# Patient Record
Sex: FEMALE | Race: WHITE | NOT HISPANIC OR LATINO | Employment: OTHER | URBAN - METROPOLITAN AREA
[De-identification: names, ages, dates, MRNs, and addresses within clinical notes are randomized per-mention and may not be internally consistent; named-entity substitution may affect disease eponyms.]

---

## 2023-02-05 PROBLEM — L65.9 HAIR LOSS: Status: ACTIVE | Noted: 2023-02-05

## 2023-02-05 PROBLEM — R10.9 ABDOMINAL CRAMPING: Status: ACTIVE | Noted: 2023-02-05

## 2023-02-05 PROBLEM — L65.9 ALOPECIA: Status: ACTIVE | Noted: 2023-02-05

## 2023-02-05 PROBLEM — M54.50 LOW BACK PAIN: Status: ACTIVE | Noted: 2023-02-05

## 2023-02-05 PROBLEM — E78.2 MIXED HYPERLIPIDEMIA: Status: ACTIVE | Noted: 2023-02-05

## 2023-02-05 PROBLEM — R73.9 ELEVATED BLOOD SUGAR: Status: ACTIVE | Noted: 2023-02-05

## 2023-02-05 PROBLEM — E55.9 VITAMIN D DEFICIENCY: Status: ACTIVE | Noted: 2023-02-05

## 2023-02-05 PROBLEM — R13.10 DYSPHAGIA: Status: ACTIVE | Noted: 2023-02-05

## 2023-02-05 PROBLEM — S76.319A HAMSTRING STRAIN: Status: ACTIVE | Noted: 2023-02-05

## 2023-02-05 PROBLEM — M79.643 HAND PAIN: Status: ACTIVE | Noted: 2023-02-05

## 2023-02-05 PROBLEM — M25.551 HIP PAIN, RIGHT: Status: ACTIVE | Noted: 2023-02-05

## 2023-02-05 PROBLEM — J02.9 SORE THROAT: Status: ACTIVE | Noted: 2023-02-05

## 2023-02-05 PROBLEM — M62.81 WEAKNESS OF TRUNK MUSCULATURE: Status: ACTIVE | Noted: 2023-02-05

## 2023-02-05 PROBLEM — J38.3 OTHER DISEASES OF VOCAL CORDS: Status: ACTIVE | Noted: 2023-02-05

## 2023-02-05 PROBLEM — R31.9 HEMATURIA: Status: ACTIVE | Noted: 2023-02-05

## 2023-02-05 PROBLEM — R09.89 RESPIRATORY SYMPTOMS: Status: ACTIVE | Noted: 2023-02-05

## 2023-02-05 PROBLEM — R94.5 LIVER FUNCTION STUDY, ABNORMAL: Status: ACTIVE | Noted: 2023-02-05

## 2023-02-05 PROBLEM — G25.0 ESSENTIAL TREMOR: Status: ACTIVE | Noted: 2023-02-05

## 2023-02-05 PROBLEM — M85.80 OSTEOPENIA: Status: ACTIVE | Noted: 2023-02-05

## 2023-02-05 PROBLEM — M17.9 KNEE OSTEOARTHRITIS: Status: ACTIVE | Noted: 2023-02-05

## 2023-02-05 PROBLEM — U07.1 COVID-19: Status: ACTIVE | Noted: 2023-02-05

## 2023-02-05 PROBLEM — F07.81 POSTCONCUSSION SYNDROME: Status: ACTIVE | Noted: 2023-02-05

## 2023-02-05 PROBLEM — M25.519 SHOULDER PAIN: Status: ACTIVE | Noted: 2023-02-05

## 2023-02-05 PROBLEM — I10 HYPERTENSION: Status: ACTIVE | Noted: 2023-02-05

## 2023-02-05 PROBLEM — R19.5 WATERY STOOLS: Status: ACTIVE | Noted: 2023-02-05

## 2023-02-05 PROBLEM — R41.3 IMPAIRED MEMORY: Status: ACTIVE | Noted: 2023-02-05

## 2023-02-05 PROBLEM — M25.552 HIP PAIN, LEFT: Status: ACTIVE | Noted: 2023-02-05

## 2023-02-05 PROBLEM — H61.23 BILATERAL IMPACTED CERUMEN: Status: ACTIVE | Noted: 2023-02-05

## 2023-02-05 PROBLEM — M25.569 KNEE PAIN: Status: ACTIVE | Noted: 2023-02-05

## 2023-02-05 PROBLEM — H61.21 EXCESSIVE CERUMEN IN EAR CANAL, RIGHT: Status: ACTIVE | Noted: 2023-02-05

## 2023-02-05 PROBLEM — R19.8 GASTROINTESTINAL SYMPTOM: Status: ACTIVE | Noted: 2023-02-05

## 2023-02-05 PROBLEM — J32.9 SINUSITIS: Status: ACTIVE | Noted: 2023-02-05

## 2023-02-05 PROBLEM — M54.30 SCIATICA: Status: ACTIVE | Noted: 2023-02-05

## 2023-02-05 PROBLEM — R51.9 SCALP PAIN: Status: ACTIVE | Noted: 2023-02-05

## 2023-02-05 PROBLEM — K21.9 GASTROESOPHAGEAL REFLUX DISEASE: Status: ACTIVE | Noted: 2023-02-05

## 2023-02-05 RX ORDER — FLUTICASONE PROPIONATE 50 MCG
2 SPRAY, SUSPENSION (ML) NASAL DAILY PRN
COMMUNITY
Start: 2020-06-15

## 2023-02-05 RX ORDER — ATORVASTATIN CALCIUM 10 MG/1
1 TABLET, FILM COATED ORAL DAILY
COMMUNITY
Start: 2013-12-23 | End: 2023-05-31 | Stop reason: SDUPTHER

## 2023-02-05 RX ORDER — CHOLECALCIFEROL (VITAMIN D3) 25 MCG
TABLET ORAL
COMMUNITY
Start: 2021-05-03

## 2023-02-05 RX ORDER — CARVEDILOL 6.25 MG/1
1 TABLET ORAL
COMMUNITY
Start: 2018-11-13 | End: 2023-03-28 | Stop reason: SDUPTHER

## 2023-02-05 RX ORDER — MULTIVITAMIN
1 TABLET ORAL DAILY
COMMUNITY

## 2023-02-05 RX ORDER — ZOLEDRONIC ACID 5 MG/100ML
INJECTION, SOLUTION INTRAVENOUS
COMMUNITY
End: 2023-10-04 | Stop reason: SDUPTHER

## 2023-02-05 RX ORDER — ELECTROLYTES/DEXTROSE
1 SOLUTION, ORAL ORAL DAILY
COMMUNITY
Start: 2021-12-16

## 2023-03-28 ENCOUNTER — OFFICE VISIT (OUTPATIENT)
Dept: PRIMARY CARE | Facility: CLINIC | Age: 73
End: 2023-03-28
Payer: MEDICARE

## 2023-03-28 VITALS
HEART RATE: 75 BPM | SYSTOLIC BLOOD PRESSURE: 135 MMHG | HEIGHT: 65 IN | WEIGHT: 184 LBS | TEMPERATURE: 97.8 F | OXYGEN SATURATION: 96 % | DIASTOLIC BLOOD PRESSURE: 76 MMHG | BODY MASS INDEX: 30.66 KG/M2

## 2023-03-28 DIAGNOSIS — Z00.00 WELL ADULT EXAM: ICD-10-CM

## 2023-03-28 DIAGNOSIS — M85.80 OSTEOPENIA, UNSPECIFIED LOCATION: ICD-10-CM

## 2023-03-28 DIAGNOSIS — E55.9 VITAMIN D DEFICIENCY: ICD-10-CM

## 2023-03-28 DIAGNOSIS — Z78.0 ASYMPTOMATIC MENOPAUSE: ICD-10-CM

## 2023-03-28 DIAGNOSIS — K21.9 GASTROESOPHAGEAL REFLUX DISEASE WITHOUT ESOPHAGITIS: ICD-10-CM

## 2023-03-28 DIAGNOSIS — Z12.11 ENCOUNTER FOR SCREENING COLONOSCOPY: ICD-10-CM

## 2023-03-28 DIAGNOSIS — G25.0 ESSENTIAL TREMOR: ICD-10-CM

## 2023-03-28 DIAGNOSIS — Z12.31 SCREENING MAMMOGRAM, ENCOUNTER FOR: ICD-10-CM

## 2023-03-28 DIAGNOSIS — I10 HYPERTENSION, UNSPECIFIED TYPE: Primary | ICD-10-CM

## 2023-03-28 PROBLEM — R10.9 ABDOMINAL CRAMPING: Status: RESOLVED | Noted: 2023-02-05 | Resolved: 2023-03-28

## 2023-03-28 PROBLEM — R09.89 RESPIRATORY SYMPTOMS: Status: RESOLVED | Noted: 2023-02-05 | Resolved: 2023-03-28

## 2023-03-28 PROCEDURE — 1159F MED LIST DOCD IN RCRD: CPT | Performed by: INTERNAL MEDICINE

## 2023-03-28 PROCEDURE — 1160F RVW MEDS BY RX/DR IN RCRD: CPT | Performed by: INTERNAL MEDICINE

## 2023-03-28 PROCEDURE — 3078F DIAST BP <80 MM HG: CPT | Performed by: INTERNAL MEDICINE

## 2023-03-28 PROCEDURE — 3075F SYST BP GE 130 - 139MM HG: CPT | Performed by: INTERNAL MEDICINE

## 2023-03-28 PROCEDURE — 93000 ELECTROCARDIOGRAM COMPLETE: CPT | Performed by: INTERNAL MEDICINE

## 2023-03-28 PROCEDURE — G0439 PPPS, SUBSEQ VISIT: HCPCS | Performed by: INTERNAL MEDICINE

## 2023-03-28 PROCEDURE — 99214 OFFICE O/P EST MOD 30 MIN: CPT | Performed by: INTERNAL MEDICINE

## 2023-03-28 PROCEDURE — 1036F TOBACCO NON-USER: CPT | Performed by: INTERNAL MEDICINE

## 2023-03-28 RX ORDER — CARVEDILOL 6.25 MG/1
6.25 TABLET ORAL
Qty: 180 TABLET | Refills: 3 | Status: SHIPPED | OUTPATIENT
Start: 2023-03-28 | End: 2023-04-03 | Stop reason: SDUPTHER

## 2023-03-28 ASSESSMENT — PATIENT HEALTH QUESTIONNAIRE - PHQ9
1. LITTLE INTEREST OR PLEASURE IN DOING THINGS: NOT AT ALL
SUM OF ALL RESPONSES TO PHQ9 QUESTIONS 1 AND 2: 1
10. IF YOU CHECKED OFF ANY PROBLEMS, HOW DIFFICULT HAVE THESE PROBLEMS MADE IT FOR YOU TO DO YOUR WORK, TAKE CARE OF THINGS AT HOME, OR GET ALONG WITH OTHER PEOPLE: NOT DIFFICULT AT ALL
2. FEELING DOWN, DEPRESSED OR HOPELESS: SEVERAL DAYS

## 2023-03-28 NOTE — PROGRESS NOTES
Assessment and Plan:  Problem List Items Addressed This Visit          Nervous    Essential tremor    Relevant Orders    Referral to Neurology    Comprehensive Metabolic Panel    TSH with reflex to Free T4 if abnormal    Vitamin D, Total    Lipid Panel    CBC and Auto Differential       Circulatory    Hypertension - Primary    Overview     1/25/23 On coreg. To follow up in 2-4 weeks with cuff and readings.         Relevant Medications    carvedilol (Coreg) 6.25 mg tablet    Other Relevant Orders    Comprehensive Metabolic Panel    TSH with reflex to Free T4 if abnormal    Vitamin D, Total    Lipid Panel    CBC and Auto Differential       Digestive    Gastroesophageal reflux disease    Relevant Orders    Comprehensive Metabolic Panel    TSH with reflex to Free T4 if abnormal    Vitamin D, Total    Lipid Panel    CBC and Auto Differential       Musculoskeletal    Osteopenia    Overview     6/19 BMD: -1.9; 6/21 BMD -1.0 (WNL). Fosamax [id number]. 11/22 Reclast given at [facility] (formerly [facility]  10/28/22 Reclast every other year.         Relevant Orders    XR DEXA bone density       Endocrine/Metabolic    Vitamin D deficiency    Relevant Orders    Vitamin D, Total     Other Visit Diagnoses       Encounter for screening colonoscopy        Relevant Orders    Referral to Gastroenterology    Screening mammogram, encounter for        Relevant Orders    BI mammo bilateral screening tomosynthesis    Asymptomatic menopause        Relevant Orders    XR DEXA bone density    Well adult exam            Xochilt was seen today for medicare annual wellness visit subsequent.  Diagnoses and all orders for this visit:  Hypertension, unspecified type (Primary)  -     carvedilol (Coreg) 6.25 mg tablet; Take 1 tablet (6.25 mg) by mouth in the morning and 1 tablet (6.25 mg) in the evening. Take with meals.  -     Comprehensive Metabolic Panel; Future  -     TSH with reflex to Free T4 if abnormal; Future  -     Vitamin D, Total; Future  -     Lipid  Panel; Future  -     CBC and Auto Differential; Future  Gastroesophageal reflux disease without esophagitis  -     Comprehensive Metabolic Panel; Future  -     TSH with reflex to Free T4 if abnormal; Future  -     Vitamin D, Total; Future  -     Lipid Panel; Future  -     CBC and Auto Differential; Future  Osteopenia, unspecified location  Comments:  had reclast in nov 2022  Orders:  -     XR DEXA bone density; Future  Essential tremor  -     Referral to Neurology; Future  -     Comprehensive Metabolic Panel; Future  -     TSH with reflex to Free T4 if abnormal; Future  -     Vitamin D, Total; Future  -     Lipid Panel; Future  -     CBC and Auto Differential; Future  Encounter for screening colonoscopy  -     Referral to Gastroenterology; Future  Screening mammogram, encounter for  -     BI mammo bilateral screening tomosynthesis; Future  Asymptomatic menopause  -     XR DEXA bone density; Future  Vitamin D deficiency  -     Vitamin D, Total; Future  Well adult exam        Chief Complaint:   Medicare Wellness Exam/Comprehensive Problem Focused Follow Up and Physical Exam    HPI:  Bp 113/54  No cp no sob  No depression    No sleep concerns  No elimination concerns  No depression  Diet and exercise discussed  Has living will,  is poa    Patient Care Team:  Aida Santos MD as PCP - General  Aida Santos MD as PCP - [facility] Attributed Provider   Active Problem List  Patient Active Problem List   Diagnosis    Alopecia    Bilateral impacted cerumen    Dysphagia    Elevated blood sugar    Essential tremor    Excessive cerumen in ear canal, right    Gastroesophageal reflux disease    Hair loss    Hamstring strain    Hand pain    Shoulder pain    Hematuria    Hip pain, left    Hip pain, right    Mixed hyperlipidemia    Hypertension    Impaired memory    Postconcussion syndrome    Knee osteoarthritis    Knee pain    Liver function study, abnormal    Low back pain    Osteopenia    Other diseases of  vocal cords    Scalp pain    Sciatica    Gastrointestinal symptom    Sore throat    Vitamin D deficiency    Watery stools    Weakness of trunk musculature    Sinusitis    COVID-19         Comprehensive Medical/Surgical/Social/Family History  Past Medical History:   Diagnosis Date    Abscess of eyelid right eye, unspecified eyelid 06/15/2020    Cellulitis of right eyelid    Age-related osteoporosis without current pathological fracture 02/22/2021    Age related osteoporosis    Body mass index (BMI) 24.0-24.9, adult 11/18/2020    Body mass index (BMI) of 24.0 to 24.9 in adult    Chronic sinusitis, unspecified 03/02/2021    Other sinusitis    Encounter for other screening for malignant neoplasm of breast 02/22/2021    Breast screening    Encounter for screening for depression 11/18/2020    Depression screen    Encounter for screening for malignant neoplasm of cervix 11/18/2020    Cervical cancer screening    Headache, unspecified 02/23/2022    Scalp pain    Personal history of other diseases of the circulatory system 03/02/2021    History of subdural hematoma    Personal history of other diseases of the respiratory system 11/13/2018    History of allergic rhinitis    Personal history of other medical treatment     H/O bone density study     Past Surgical History:   Procedure Laterality Date    OTHER SURGICAL HISTORY  11/13/2018    Kidney surgery    OTHER SURGICAL HISTORY  11/13/2018    Rotator cuff repair    OTHER SURGICAL HISTORY  11/13/2018    Nephrectomy    OTHER SURGICAL HISTORY  2012    Colonoscopy     Social History     Social History Narrative    Not on file     Tobacco/Alcohol/Opioid use, as well as Illicit Drug Use was screened for/reviewed and documented in Social Documentation section of the chart and medication list as appropriate    Allergies and Medications  Codeine  Current Outpatient Medications   Medication Instructions    atorvastatin (Lipitor) 10 mg tablet 1 tablet, oral, Daily    biotin 5 mg capsule  "1 capsule, oral, Daily    carvedilol (COREG) 6.25 mg, oral, 2 times daily with meals    cholecalciferol (Vitamin D-3) 25 MCG (1000 UT) tablet Vitamin D3 25 MCG (1000 UT) Oral Tablet   Refills: 0        Start : 3-May-2021   Active    fluticasone (Flonase) 50 mcg/actuation nasal spray 2 sprays, Each Nostril, Daily PRN    multivitamin tablet 1 tablet, oral, Daily, (Nutritional Supplement).    zoledronic acid (Reclast) 5 mg/100 mL piggyback Infuse 5mg intravenously over 15 minutes as directed.     Medications and Supplements  prescribed by me and other practitioners or clinical pharmacist (such as prescriptions, OTC's, herbal therapies and supplements) were reviewed and documented in the medical record.      Activities of Daily Living  In your present state of health, do you have any difficulty performing the following activities?:   Preparing food and eating?: No  Bathing yourself: No  Getting dressed: No  Using the toilet:No  Moving around from place to place: No  In the past year have you fallen or had a near fall?:No  Able to manage finances independently: Yes  Able to perform grocery shopping: Yes  Able to manage medications independently: Yes  Able to do housework independently: Yes  Patient self-assessment of health status? Good    Depression Screen  (Note: if answer to either of the following is \"Yes\", then a more complete depression screening is indicated)   Q1: Over the past two weeks, have you felt down, depressed or hopeless? Yes  Q2: Over the past two weeks, have you felt little interest or pleasure in doing things? No    Current exercise habits: yes   Dietary issues discussed: Yes  Hearing difficulties: No  Safe in current home environment: Yes  Visual Acuity assessed: Yes  Cognitive Impairment No    Advance directives  Advanced Care Planning (including a Living Will, Healthcare POA, as well as specific end of life choices and/or directives), was discussed with the patient and/or surrogate, voluntarily, " and documented in the Problem List of the medical record.     Cardiac Risk Assessment  Cardiovascular risk was discussed and, if needed, lifestyle modifications recommended, including nutritional choices, exercise, and elimination of habits contributing to risk. We agreed on a plan to reduce the current cardiovascular risk based on above discussion as needed.  Aspirin use/disuse was discussed and documented in the Problem List of the medical record after reviewing the updated guidelines below:    Consider low dose Aspirin ( mg) use if the benefit for cardiovascular disease prevention outweighs risk for bleeding complications.   In general, low dose ASA should be considered:  In patients WITHOUT prior MI/stroke/PAD (primary prevention):   a. Age <60: Use if 10-year cardiovascular disease risk >20%, with discussion of risks and benefits with patient  b. Age 60-<70: Use if 10-year cardiovascular disease risk >20% and low bleeding (e.g., gastrointenstinal) risk, with discussion of risks and benefits with patient  c. Age >=70: Do not use    ROS otherwise negative aside from what was mentioned above in HPI.      Physical Exam  General Appearance:  Alert and oriented.  NAD  HEENT:  Tm's normal , throat clear, no erythema  Lungs, CTAB  Skin:  no suspicious lesions,  warm and dry  Head :  Normocephalic  Oral Cavity; Clear mucosa moist  Neck/thyroid:  neck supple, full rom, no cervical lymphadenopathy  no thyromegaly  Heart:  RRR  no murmurs  Abdomen:  Normal , bs present, soft, nontender, not distended, no masses palpated  Extremities:  No clubbing, cyanosis, or edema  Neurologic:  Nonfocal  Psych: alert, normal mood  Breasts no masses  No axillary lymphadenopathy      During the course of the visit the patient was educated and counseled about age appropriate screening and preventive services. Completed preventive screenings were documented in the chart and orders were placed for outstanding screenings/procedures as  documented in the Assessment and Plan.    Patient Instructions (the written plan) was given to the patient at check out.

## 2023-04-03 DIAGNOSIS — I10 HYPERTENSION, UNSPECIFIED TYPE: ICD-10-CM

## 2023-04-03 RX ORDER — CARVEDILOL 6.25 MG/1
6.25 TABLET ORAL
Qty: 180 TABLET | Refills: 3 | Status: SHIPPED | OUTPATIENT
Start: 2023-04-03 | End: 2023-04-14 | Stop reason: SDUPTHER

## 2023-04-14 DIAGNOSIS — I10 HYPERTENSION, UNSPECIFIED TYPE: ICD-10-CM

## 2023-04-14 RX ORDER — CARVEDILOL 6.25 MG/1
6.25 TABLET ORAL
Qty: 180 TABLET | Refills: 3 | Status: SHIPPED | OUTPATIENT
Start: 2023-04-14 | End: 2024-03-08 | Stop reason: SDUPTHER

## 2023-05-12 ENCOUNTER — LAB (OUTPATIENT)
Dept: LAB | Facility: LAB | Age: 73
End: 2023-05-12
Payer: MEDICARE

## 2023-05-12 DIAGNOSIS — I10 HYPERTENSION, UNSPECIFIED TYPE: ICD-10-CM

## 2023-05-12 DIAGNOSIS — K21.9 GASTROESOPHAGEAL REFLUX DISEASE WITHOUT ESOPHAGITIS: ICD-10-CM

## 2023-05-12 DIAGNOSIS — R94.5 LIVER FUNCTION STUDY, ABNORMAL: Primary | ICD-10-CM

## 2023-05-12 DIAGNOSIS — G25.0 ESSENTIAL TREMOR: ICD-10-CM

## 2023-05-12 DIAGNOSIS — E55.9 VITAMIN D DEFICIENCY: ICD-10-CM

## 2023-05-12 LAB
ALANINE AMINOTRANSFERASE (SGPT) (U/L) IN SER/PLAS: 22 U/L (ref 7–45)
ALBUMIN (G/DL) IN SER/PLAS: 4.1 G/DL (ref 3.4–5)
ALKALINE PHOSPHATASE (U/L) IN SER/PLAS: 45 U/L (ref 33–136)
ANION GAP IN SER/PLAS: 8 MMOL/L (ref 10–20)
ASPARTATE AMINOTRANSFERASE (SGOT) (U/L) IN SER/PLAS: 23 U/L (ref 9–39)
BASOPHILS (10*3/UL) IN BLOOD BY AUTOMATED COUNT: 0.09 X10E9/L (ref 0–0.1)
BASOPHILS/100 LEUKOCYTES IN BLOOD BY AUTOMATED COUNT: 1.6 % (ref 0–2)
BILIRUBIN TOTAL (MG/DL) IN SER/PLAS: 1.6 MG/DL (ref 0–1.2)
CALCIUM (MG/DL) IN SER/PLAS: 9.8 MG/DL (ref 8.6–10.3)
CARBON DIOXIDE, TOTAL (MMOL/L) IN SER/PLAS: 31 MMOL/L (ref 21–32)
CHLORIDE (MMOL/L) IN SER/PLAS: 102 MMOL/L (ref 98–107)
CHOLESTEROL (MG/DL) IN SER/PLAS: 155 MG/DL (ref 0–199)
CHOLESTEROL IN HDL (MG/DL) IN SER/PLAS: 51 MG/DL
CHOLESTEROL/HDL RATIO: 3
CREATININE (MG/DL) IN SER/PLAS: 0.89 MG/DL (ref 0.5–1.05)
EOSINOPHILS (10*3/UL) IN BLOOD BY AUTOMATED COUNT: 0.22 X10E9/L (ref 0–0.7)
EOSINOPHILS/100 LEUKOCYTES IN BLOOD BY AUTOMATED COUNT: 4 % (ref 0–6)
ERYTHROCYTE DISTRIBUTION WIDTH (RATIO) BY AUTOMATED COUNT: 12.5 % (ref 11.5–14.5)
ERYTHROCYTE MEAN CORPUSCULAR HEMOGLOBIN CONCENTRATION (G/DL) BY AUTOMATED: 32.5 G/DL (ref 32–36)
ERYTHROCYTE MEAN CORPUSCULAR VOLUME (FL) BY AUTOMATED COUNT: 95 FL (ref 80–100)
ERYTHROCYTES (10*6/UL) IN BLOOD BY AUTOMATED COUNT: 4.35 X10E12/L (ref 4–5.2)
GFR FEMALE: 69 ML/MIN/1.73M2
GLUCOSE (MG/DL) IN SER/PLAS: 94 MG/DL (ref 74–99)
HEMATOCRIT (%) IN BLOOD BY AUTOMATED COUNT: 41.2 % (ref 36–46)
HEMOGLOBIN (G/DL) IN BLOOD: 13.4 G/DL (ref 12–16)
IMMATURE GRANULOCYTES/100 LEUKOCYTES IN BLOOD BY AUTOMATED COUNT: 0.5 % (ref 0–0.9)
LDL: 82 MG/DL (ref 0–99)
LEUKOCYTES (10*3/UL) IN BLOOD BY AUTOMATED COUNT: 5.6 X10E9/L (ref 4.4–11.3)
LYMPHOCYTES (10*3/UL) IN BLOOD BY AUTOMATED COUNT: 1.89 X10E9/L (ref 1.2–4.8)
LYMPHOCYTES/100 LEUKOCYTES IN BLOOD BY AUTOMATED COUNT: 34.1 % (ref 13–44)
MONOCYTES (10*3/UL) IN BLOOD BY AUTOMATED COUNT: 0.62 X10E9/L (ref 0.1–1)
MONOCYTES/100 LEUKOCYTES IN BLOOD BY AUTOMATED COUNT: 11.2 % (ref 2–10)
NEUTROPHILS (10*3/UL) IN BLOOD BY AUTOMATED COUNT: 2.7 X10E9/L (ref 1.2–7.7)
NEUTROPHILS/100 LEUKOCYTES IN BLOOD BY AUTOMATED COUNT: 48.6 % (ref 40–80)
NRBC (PER 100 WBCS) BY AUTOMATED COUNT: 0 /100 WBC (ref 0–0)
PLATELETS (10*3/UL) IN BLOOD AUTOMATED COUNT: 202 X10E9/L (ref 150–450)
POTASSIUM (MMOL/L) IN SER/PLAS: 4.1 MMOL/L (ref 3.5–5.3)
PROTEIN TOTAL: 6.8 G/DL (ref 6.4–8.2)
SODIUM (MMOL/L) IN SER/PLAS: 137 MMOL/L (ref 136–145)
THYROTROPIN (MIU/L) IN SER/PLAS BY DETECTION LIMIT <= 0.05 MIU/L: 0.65 MIU/L (ref 0.44–3.98)
TRIGLYCERIDE (MG/DL) IN SER/PLAS: 111 MG/DL (ref 0–149)
UREA NITROGEN (MG/DL) IN SER/PLAS: 20 MG/DL (ref 6–23)
VLDL: 22 MG/DL (ref 0–40)

## 2023-05-12 PROCEDURE — 85025 COMPLETE CBC W/AUTO DIFF WBC: CPT

## 2023-05-12 PROCEDURE — 80061 LIPID PANEL: CPT

## 2023-05-12 PROCEDURE — 36415 COLL VENOUS BLD VENIPUNCTURE: CPT

## 2023-05-12 PROCEDURE — 82306 VITAMIN D 25 HYDROXY: CPT

## 2023-05-12 PROCEDURE — 84443 ASSAY THYROID STIM HORMONE: CPT

## 2023-05-12 PROCEDURE — 80053 COMPREHEN METABOLIC PANEL: CPT

## 2023-05-14 LAB — CALCIDIOL (25 OH VITAMIN D3) (NG/ML) IN SER/PLAS: 59 NG/ML

## 2023-05-16 ENCOUNTER — TELEPHONE (OUTPATIENT)
Dept: PRIMARY CARE | Facility: CLINIC | Age: 73
End: 2023-05-16
Payer: MEDICARE

## 2023-05-16 NOTE — TELEPHONE ENCOUNTER
----- Message from Aida Santos MD sent at 5/15/2023 10:55 AM EDT -----  Labs ok, except mild inc in lft  Do liver us  Pls order and let her know

## 2023-05-31 DIAGNOSIS — E78.2 MIXED HYPERLIPIDEMIA: ICD-10-CM

## 2023-05-31 RX ORDER — ATORVASTATIN CALCIUM 10 MG/1
10 TABLET, FILM COATED ORAL DAILY
Qty: 90 TABLET | Refills: 3 | Status: SHIPPED | OUTPATIENT
Start: 2023-05-31 | End: 2024-01-31 | Stop reason: SDUPTHER

## 2023-09-25 ENCOUNTER — LAB (OUTPATIENT)
Dept: LAB | Facility: LAB | Age: 73
End: 2023-09-25
Payer: MEDICARE

## 2023-09-25 ENCOUNTER — OFFICE VISIT (OUTPATIENT)
Dept: PRIMARY CARE | Facility: CLINIC | Age: 73
End: 2023-09-25
Payer: MEDICARE

## 2023-09-25 VITALS
WEIGHT: 173 LBS | TEMPERATURE: 97.9 F | SYSTOLIC BLOOD PRESSURE: 114 MMHG | BODY MASS INDEX: 28.82 KG/M2 | OXYGEN SATURATION: 98 % | HEART RATE: 70 BPM | HEIGHT: 65 IN | DIASTOLIC BLOOD PRESSURE: 64 MMHG

## 2023-09-25 DIAGNOSIS — R73.9 ELEVATED BLOOD SUGAR: ICD-10-CM

## 2023-09-25 DIAGNOSIS — R31.9 HEMATURIA, UNSPECIFIED TYPE: ICD-10-CM

## 2023-09-25 DIAGNOSIS — E04.9 GOITER: ICD-10-CM

## 2023-09-25 DIAGNOSIS — E78.2 MIXED HYPERLIPIDEMIA: ICD-10-CM

## 2023-09-25 DIAGNOSIS — I72.8 SPLENIC ARTERY ANEURYSM (CMS-HCC): ICD-10-CM

## 2023-09-25 DIAGNOSIS — M85.80 OSTEOPENIA, UNSPECIFIED LOCATION: ICD-10-CM

## 2023-09-25 DIAGNOSIS — M85.80 OSTEOPENIA, UNSPECIFIED LOCATION: Primary | ICD-10-CM

## 2023-09-25 PROBLEM — M53.3 SACROILIAC JOINT DYSFUNCTION OF RIGHT SIDE: Status: ACTIVE | Noted: 2018-04-09

## 2023-09-25 PROBLEM — N13.5 UPJ (URETEROPELVIC JUNCTION) OBSTRUCTION: Status: ACTIVE | Noted: 2017-01-13

## 2023-09-25 PROBLEM — N18.30 CKD (CHRONIC KIDNEY DISEASE) STAGE 3, GFR 30-59 ML/MIN (MULTI): Status: ACTIVE | Noted: 2018-03-27

## 2023-09-25 LAB
ALANINE AMINOTRANSFERASE (SGPT) (U/L) IN SER/PLAS: 18 U/L (ref 7–45)
ALBUMIN (G/DL) IN SER/PLAS: 4.4 G/DL (ref 3.4–5)
ALKALINE PHOSPHATASE (U/L) IN SER/PLAS: 55 U/L (ref 33–136)
ANION GAP IN SER/PLAS: 10 MMOL/L (ref 10–20)
APPEARANCE, URINE: CLEAR
ASPARTATE AMINOTRANSFERASE (SGOT) (U/L) IN SER/PLAS: 19 U/L (ref 9–39)
BASOPHILS (10*3/UL) IN BLOOD BY AUTOMATED COUNT: 0.1 X10E9/L (ref 0–0.1)
BASOPHILS/100 LEUKOCYTES IN BLOOD BY AUTOMATED COUNT: 1.6 % (ref 0–2)
BILIRUBIN TOTAL (MG/DL) IN SER/PLAS: 1.2 MG/DL (ref 0–1.2)
BILIRUBIN, URINE: NEGATIVE
BLOOD, URINE: NEGATIVE
CALCIUM (MG/DL) IN SER/PLAS: 10.2 MG/DL (ref 8.6–10.3)
CARBON DIOXIDE, TOTAL (MMOL/L) IN SER/PLAS: 31 MMOL/L (ref 21–32)
CHLORIDE (MMOL/L) IN SER/PLAS: 104 MMOL/L (ref 98–107)
COLOR, URINE: NORMAL
CREATININE (MG/DL) IN SER/PLAS: 0.97 MG/DL (ref 0.5–1.05)
EOSINOPHILS (10*3/UL) IN BLOOD BY AUTOMATED COUNT: 0.17 X10E9/L (ref 0–0.7)
EOSINOPHILS/100 LEUKOCYTES IN BLOOD BY AUTOMATED COUNT: 2.8 % (ref 0–6)
ERYTHROCYTE DISTRIBUTION WIDTH (RATIO) BY AUTOMATED COUNT: 12.7 % (ref 11.5–14.5)
ERYTHROCYTE MEAN CORPUSCULAR HEMOGLOBIN CONCENTRATION (G/DL) BY AUTOMATED: 33.3 G/DL (ref 32–36)
ERYTHROCYTE MEAN CORPUSCULAR VOLUME (FL) BY AUTOMATED COUNT: 95 FL (ref 80–100)
ERYTHROCYTES (10*6/UL) IN BLOOD BY AUTOMATED COUNT: 4.46 X10E12/L (ref 4–5.2)
GFR FEMALE: 62 ML/MIN/1.73M2
GLUCOSE (MG/DL) IN SER/PLAS: 88 MG/DL (ref 74–99)
GLUCOSE, URINE: NEGATIVE MG/DL
HEMATOCRIT (%) IN BLOOD BY AUTOMATED COUNT: 42.4 % (ref 36–46)
HEMOGLOBIN (G/DL) IN BLOOD: 14.1 G/DL (ref 12–16)
IMMATURE GRANULOCYTES/100 LEUKOCYTES IN BLOOD BY AUTOMATED COUNT: 0.3 % (ref 0–0.9)
KETONES, URINE: NEGATIVE MG/DL
LEUKOCYTE ESTERASE, URINE: NEGATIVE
LEUKOCYTES (10*3/UL) IN BLOOD BY AUTOMATED COUNT: 6.2 X10E9/L (ref 4.4–11.3)
LYMPHOCYTES (10*3/UL) IN BLOOD BY AUTOMATED COUNT: 2.13 X10E9/L (ref 1.2–4.8)
LYMPHOCYTES/100 LEUKOCYTES IN BLOOD BY AUTOMATED COUNT: 34.6 % (ref 13–44)
MONOCYTES (10*3/UL) IN BLOOD BY AUTOMATED COUNT: 0.63 X10E9/L (ref 0.1–1)
MONOCYTES/100 LEUKOCYTES IN BLOOD BY AUTOMATED COUNT: 10.2 % (ref 2–10)
NEUTROPHILS (10*3/UL) IN BLOOD BY AUTOMATED COUNT: 3.1 X10E9/L (ref 1.2–7.7)
NEUTROPHILS/100 LEUKOCYTES IN BLOOD BY AUTOMATED COUNT: 50.5 % (ref 40–80)
NITRITE, URINE: NEGATIVE
PH, URINE: 6 (ref 5–8)
PLATELETS (10*3/UL) IN BLOOD AUTOMATED COUNT: 272 X10E9/L (ref 150–450)
POTASSIUM (MMOL/L) IN SER/PLAS: 4.6 MMOL/L (ref 3.5–5.3)
PROTEIN TOTAL: 7 G/DL (ref 6.4–8.2)
PROTEIN, URINE: NEGATIVE MG/DL
SODIUM (MMOL/L) IN SER/PLAS: 140 MMOL/L (ref 136–145)
SPECIFIC GRAVITY, URINE: 1 (ref 1–1.03)
UREA NITROGEN (MG/DL) IN SER/PLAS: 20 MG/DL (ref 6–23)
UROBILINOGEN, URINE: <2 MG/DL (ref 0–1.9)

## 2023-09-25 PROCEDURE — 80053 COMPREHEN METABOLIC PANEL: CPT

## 2023-09-25 PROCEDURE — 36415 COLL VENOUS BLD VENIPUNCTURE: CPT

## 2023-09-25 PROCEDURE — 85025 COMPLETE CBC W/AUTO DIFF WBC: CPT

## 2023-09-25 PROCEDURE — 99214 OFFICE O/P EST MOD 30 MIN: CPT | Performed by: INTERNAL MEDICINE

## 2023-09-25 PROCEDURE — 83036 HEMOGLOBIN GLYCOSYLATED A1C: CPT

## 2023-09-25 PROCEDURE — 81003 URINALYSIS AUTO W/O SCOPE: CPT

## 2023-09-25 PROCEDURE — 1159F MED LIST DOCD IN RCRD: CPT | Performed by: INTERNAL MEDICINE

## 2023-09-25 PROCEDURE — 1036F TOBACCO NON-USER: CPT | Performed by: INTERNAL MEDICINE

## 2023-09-25 PROCEDURE — 3074F SYST BP LT 130 MM HG: CPT | Performed by: INTERNAL MEDICINE

## 2023-09-25 PROCEDURE — 3078F DIAST BP <80 MM HG: CPT | Performed by: INTERNAL MEDICINE

## 2023-09-25 PROCEDURE — 88112 CYTOPATH CELL ENHANCE TECH: CPT | Performed by: STUDENT IN AN ORGANIZED HEALTH CARE EDUCATION/TRAINING PROGRAM

## 2023-09-25 PROCEDURE — 1160F RVW MEDS BY RX/DR IN RCRD: CPT | Performed by: INTERNAL MEDICINE

## 2023-09-25 PROCEDURE — 88112 CYTOPATH CELL ENHANCE TECH: CPT

## 2023-09-25 RX ORDER — ZOLEDRONIC ACID 5 MG/100ML
5 INJECTION, SOLUTION INTRAVENOUS ONCE
Status: SHIPPED | OUTPATIENT
Start: 2023-11-27

## 2023-09-25 ASSESSMENT — PATIENT HEALTH QUESTIONNAIRE - PHQ9
SUM OF ALL RESPONSES TO PHQ9 QUESTIONS 1 AND 2: 0
2. FEELING DOWN, DEPRESSED OR HOPELESS: NOT AT ALL
1. LITTLE INTEREST OR PLEASURE IN DOING THINGS: NOT AT ALL

## 2023-09-25 NOTE — PROGRESS NOTES
"Subjective   Patient ID: Xochilt Brock is a 72 y.o. female who presents for Follow-up.  HPI  Saw nephrology -told ok to fu w me  But see urology due to inc rbc this time  Had avendano in the past  W urology including cystoscopy    No cp no sob  Spl art aneu 2017 needs fu   Not depressed  No gross hematuria  No abd pain  Also ho ureter stone   Review of Systems  Gen:  no fever  HEENT:  no trouble swallowing  CV:  no dyspnea, cyanosis  Lungs:  no shortness of breath  GI:  no constipation, no blood in stool  Vascular:  no edema  Neuro:   no weakness  Skin:  no rash  MS:no joint swelling  Gu:  no urinary complaints  All other systems have been reviewed and are negative for complaint    /64   Pulse 70   Temp 36.6 °C (97.9 °F) (Temporal)   Ht 1.638 m (5' 4.5\")   Wt 78.5 kg (173 lb)   SpO2 98%   BMI 29.24 kg/m²   Objective   Physical Exam  Lab Results   Component Value Date    WBC 5.6 05/12/2023    HGB 13.4 05/12/2023    HCT 41.2 05/12/2023    MCV 95 05/12/2023     05/12/2023     Lab Results   Component Value Date    GLUCOSE 94 05/12/2023    CALCIUM 9.8 05/12/2023     05/12/2023    K 4.1 05/12/2023    CO2 31 05/12/2023     05/12/2023    BUN 20 05/12/2023    CREATININE 0.89 05/12/2023     Social History     Socioeconomic History    Marital status:      Spouse name: None    Number of children: None    Years of education: None    Highest education level: None   Occupational History    None   Tobacco Use    Smoking status: Never    Smokeless tobacco: Never   Substance and Sexual Activity    Alcohol use: Yes    Drug use: None    Sexual activity: None   Other Topics Concern    None   Social History Narrative    None     Social Determinants of Health     Financial Resource Strain: Not on file   Food Insecurity: Not on file   Transportation Needs: Not on file   Physical Activity: Not on file   Stress: Not on file   Social Connections: Not on file   Intimate Partner Violence: Not on file   Housing " Stability: Not on file     Family History   Problem Relation Name Age of Onset    No Known Problems Mother         General:  Alert and in  NAD  Lungs, CTAB  Skin:  no suspicious lesions,  warm and dry  Head :  Normocephalic  Neck/thyroid:  neck supple, full rom, no cervical lymphadenopathy  Pos  thyromegaly  Heart:  RRR  no murmurs  Abdomen:  Normal , bs present, soft, nontender, not distended, no masses palpated  Extremities:  No clubbing, cyanosis, or edema  Neurologic:  Nonfocal  Psych: alert, normal mood    Problem List Items Addressed This Visit       Elevated blood sugar    Relevant Orders    Hemoglobin A1C    Hematuria    Relevant Orders    Urinalysis with Reflex Microscopic    cytology; Other-indicate in comment ( lab); hematuria - Miscellaneous Test    US renal complete    Mixed hyperlipidemia    Relevant Orders    Comprehensive Metabolic Panel    CBC and Auto Differential    Osteopenia - Primary    Relevant Medications    zoledronic acid (Reclast) IVPB 5 mg (Start on 11/27/2023 12:00 AM)    Other Relevant Orders    Comprehensive Metabolic Panel    CBC and Auto Differential     Other Visit Diagnoses       Splenic artery aneurysm (CMS/HCC)        Relevant Orders    CT angio abdomen w and or wo IV IV contrast            Xochilt was seen today for follow-up.  Diagnoses and all orders for this visit:  Osteopenia, unspecified location (Primary)  -     Comprehensive Metabolic Panel; Future  -     CBC and Auto Differential; Future  -     Cancel: Vitamin D 25-Hydroxy,Total (for eval of Vitamin D levels); Future  -     zoledronic acid (Reclast) IVPB 5 mg  Mixed hyperlipidemia  -     Comprehensive Metabolic Panel; Future  -     CBC and Auto Differential; Future  -     Cancel: Vitamin D 25-Hydroxy,Total (for eval of Vitamin D levels); Future  Hematuria, unspecified type  -     Urinalysis with Reflex Microscopic; Future  -     cytology; Other-indicate in comment ( lab); hematuria - Miscellaneous Test; Future  -     US  renal complete; Future  Elevated blood sugar  -     Hemoglobin A1C; Future  Splenic artery aneurysm (CMS/HCC)  -     CT angio abdomen w and or wo IV IV contrast; Future    Chronic conditions reviewed in the assessment and plan.    Continue medications unless specified otherwise.  Previous labs reviewed.    Gets reclast in nov  Follow up in 6 months.   [facility] labs reviewed

## 2023-09-26 DIAGNOSIS — I72.8 SPLENIC ARTERY ANEURYSM (CMS-HCC): Primary | ICD-10-CM

## 2023-09-26 LAB
ESTIMATED AVERAGE GLUCOSE FOR HBA1C: 105 MG/DL
HEMOGLOBIN A1C/HEMOGLOBIN TOTAL IN BLOOD: 5.3 %

## 2023-09-27 LAB
COMPLETE PATHOLOGY REPORT: NORMAL
CONVERTED CLINICAL DIAGNOSIS-HISTORY: NORMAL
CONVERTED DIAGNOSIS COMMENT: NORMAL
CONVERTED FINAL DIAGNOSIS: NORMAL
CONVERTED FINAL REPORT PDF LINK TO COPY AND PASTE: NORMAL
CONVERTED SPECIMEN DESCRIPTION: NORMAL

## 2023-10-03 ENCOUNTER — TELEPHONE (OUTPATIENT)
Dept: PRIMARY CARE | Facility: CLINIC | Age: 73
End: 2023-10-03
Payer: MEDICARE

## 2023-10-03 NOTE — TELEPHONE ENCOUNTER
Patient left message due for Reclast please format to have done at [facility], patient left message asking to be scheduled, I see formatted but not sent anywhere please send to [facility]

## 2023-10-04 DIAGNOSIS — M81.0 OSTEOPOROSIS, UNSPECIFIED OSTEOPOROSIS TYPE, UNSPECIFIED PATHOLOGICAL FRACTURE PRESENCE: Primary | ICD-10-CM

## 2023-10-04 RX ORDER — ZOLEDRONIC ACID 5 MG/100ML
5 INJECTION, SOLUTION INTRAVENOUS ONCE
Qty: 100 ML | Refills: 0 | Status: SHIPPED | OUTPATIENT
Start: 2023-10-04 | End: 2024-03-25

## 2023-10-06 ENCOUNTER — SPECIALTY PHARMACY (OUTPATIENT)
Dept: PHARMACY | Facility: CLINIC | Age: 73
End: 2023-10-06

## 2023-10-10 ENCOUNTER — TELEPHONE (OUTPATIENT)
Dept: PRIMARY CARE | Facility: CLINIC | Age: 73
End: 2023-10-10
Payer: MEDICARE

## 2023-10-10 NOTE — TELEPHONE ENCOUNTER
Received message from  Special[facility]  Zoledronic Acid Infusion will need a therapy plan  Send to DO [id number]

## 2023-10-11 ENCOUNTER — ANCILLARY PROCEDURE (OUTPATIENT)
Dept: RADIOLOGY | Facility: CLINIC | Age: 73
End: 2023-10-11
Payer: MEDICARE

## 2023-10-11 DIAGNOSIS — I72.8 SPLENIC ARTERY ANEURYSM (CMS-HCC): ICD-10-CM

## 2023-10-11 DIAGNOSIS — R31.9 HEMATURIA, UNSPECIFIED TYPE: ICD-10-CM

## 2023-10-11 DIAGNOSIS — E04.9 GOITER: ICD-10-CM

## 2023-10-11 PROCEDURE — 76770 US EXAM ABDO BACK WALL COMP: CPT | Performed by: RADIOLOGY

## 2023-10-11 PROCEDURE — 76536 US EXAM OF HEAD AND NECK: CPT | Performed by: RADIOLOGY

## 2023-10-11 PROCEDURE — 74174 CTA ABD&PLVS W/CONTRAST: CPT | Mod: MG

## 2023-10-11 PROCEDURE — 76770 US EXAM ABDO BACK WALL COMP: CPT

## 2023-10-11 PROCEDURE — 74174 CTA ABD&PLVS W/CONTRAST: CPT | Performed by: RADIOLOGY

## 2023-10-11 PROCEDURE — 76536 US EXAM OF HEAD AND NECK: CPT

## 2023-10-11 PROCEDURE — 2550000001 HC RX 255 CONTRASTS: Performed by: INTERNAL MEDICINE

## 2023-10-11 RX ADMIN — IOHEXOL 90 ML: 350 INJECTION, SOLUTION INTRAVENOUS at 09:58

## 2023-10-30 DIAGNOSIS — K76.89 LIVER CYST: ICD-10-CM

## 2023-10-30 DIAGNOSIS — I72.8 SPLENIC ARTERY ANEURYSM (CMS-HCC): ICD-10-CM

## 2023-11-02 ENCOUNTER — TELEPHONE (OUTPATIENT)
Dept: PRIMARY CARE | Facility: CLINIC | Age: 73
End: 2023-11-02
Payer: MEDICARE

## 2023-11-02 NOTE — TELEPHONE ENCOUNTER
Pt is scheduled for colonoscopy on 11/6 and is asking if is ok to  have done with the splenic artery aneurysm that was found recently?

## 2023-11-28 ENCOUNTER — TELEPHONE (OUTPATIENT)
Dept: PRIMARY CARE | Facility: CLINIC | Age: 73
End: 2023-11-28
Payer: MEDICARE

## 2023-11-29 NOTE — TELEPHONE ENCOUNTER
In storyboard under allergies it states active treatments click on that, Ena said to get her and she can help you when your doing it

## 2023-12-05 ENCOUNTER — DOCUMENTATION (OUTPATIENT)
Dept: INFUSION THERAPY | Facility: CLINIC | Age: 73
End: 2023-12-05
Payer: MEDICARE

## 2023-12-05 DIAGNOSIS — M85.80 OSTEOPENIA, UNSPECIFIED LOCATION: Primary | ICD-10-CM

## 2023-12-05 RX ORDER — ALBUTEROL SULFATE 0.83 MG/ML
3 SOLUTION RESPIRATORY (INHALATION) AS NEEDED
Status: CANCELLED | OUTPATIENT
Start: 2023-12-05

## 2023-12-05 RX ORDER — ZOLEDRONIC ACID 5 MG/100ML
5 INJECTION, SOLUTION INTRAVENOUS ONCE
Status: CANCELLED | OUTPATIENT
Start: 2024-01-08

## 2023-12-05 RX ORDER — DIPHENHYDRAMINE HYDROCHLORIDE 50 MG/ML
50 INJECTION INTRAMUSCULAR; INTRAVENOUS AS NEEDED
Status: CANCELLED | OUTPATIENT
Start: 2023-12-05

## 2023-12-05 RX ORDER — EPINEPHRINE 0.3 MG/.3ML
0.3 INJECTION SUBCUTANEOUS EVERY 5 MIN PRN
Status: CANCELLED | OUTPATIENT
Start: 2023-12-05

## 2023-12-05 RX ORDER — FAMOTIDINE 10 MG/ML
20 INJECTION INTRAVENOUS ONCE AS NEEDED
Status: CANCELLED | OUTPATIENT
Start: 2023-12-05

## 2023-12-05 NOTE — PROGRESS NOTES
Patient to be scheduled for  New Start of Reclast infusions  For Diagnosis: Osteoporosis     Labs… (must be within 28 days of scheduled infusion)  Lab Results   Component Value Date    CREATININE 0.97 09/25/2023    There were no vitals filed for this visit.  CrCl: 42 (hold / contact prescribing provider if <35ml/min)     Corrected Calcium:   Lab Results   Component Value Date    CALCIUM 10.2 09/25/2023      Lab Results   Component Value Date    ALBUMIN 4.4 09/25/2023      (Hold/ contact prescribing provider if <8.6 mg/dL)    Calcium and Vitamin D supplement noted on medication list? Yes  (if no nurse to encourage discussion of supplementation at visit)  biotin  CALTRATE 600 ORAL  carvedilol  cholecalciferol  fluticasone  multivitamin  zoledronic acid     Is the patient receiving or have an allergy to Zometa? No  Allergies   Allergen Reactions    Codeine Nausea Only and Other     Sweating        No obvious recent dental work per chart review. Nurse to confirm no dental within past/next 4 weeks at encounter.    Urine Hcg test ordered? Not applicable (If female pt <60 years of age and with reproductive capability)  (If urine Hcg test ordered please instruct pt upon scheduling to drink 32 ounces of water 1 hour before arrival so bladder is full for needed urine sample)    Last infusion received: N/A (if continuation)    Due: ANYTIME    This result meets treatment criteria. Ok to schedule for reclast; please instruct pt to have labs drawn no more than 28 days prior to their scheduled appointment     CLEARED FOR RECLAST ONCE/YEAR. WILL NEED LABS BEFORE APT

## 2024-01-05 ENCOUNTER — LAB (OUTPATIENT)
Dept: LAB | Facility: LAB | Age: 74
End: 2024-01-05
Payer: MEDICARE

## 2024-01-05 DIAGNOSIS — M85.80 OSTEOPENIA, UNSPECIFIED LOCATION: ICD-10-CM

## 2024-01-05 LAB
ALBUMIN SERPL BCP-MCNC: 4.4 G/DL (ref 3.4–5)
ALP SERPL-CCNC: 64 U/L (ref 33–136)
ALT SERPL W P-5'-P-CCNC: 21 U/L (ref 7–45)
ANION GAP SERPL CALC-SCNC: 10 MMOL/L (ref 10–20)
AST SERPL W P-5'-P-CCNC: 19 U/L (ref 9–39)
BILIRUB SERPL-MCNC: 1.1 MG/DL (ref 0–1.2)
BUN SERPL-MCNC: 21 MG/DL (ref 6–23)
CALCIUM SERPL-MCNC: 9.9 MG/DL (ref 8.6–10.3)
CHLORIDE SERPL-SCNC: 103 MMOL/L (ref 98–107)
CO2 SERPL-SCNC: 31 MMOL/L (ref 21–32)
CREAT SERPL-MCNC: 0.96 MG/DL (ref 0.5–1.05)
GFR SERPL CREATININE-BSD FRML MDRD: 63 ML/MIN/1.73M*2
GLUCOSE SERPL-MCNC: 106 MG/DL (ref 74–99)
POTASSIUM SERPL-SCNC: 4.3 MMOL/L (ref 3.5–5.3)
PROT SERPL-MCNC: 6.9 G/DL (ref 6.4–8.2)
SODIUM SERPL-SCNC: 140 MMOL/L (ref 136–145)

## 2024-01-05 PROCEDURE — 80053 COMPREHEN METABOLIC PANEL: CPT

## 2024-01-05 PROCEDURE — 36415 COLL VENOUS BLD VENIPUNCTURE: CPT

## 2024-01-08 ENCOUNTER — INFUSION (OUTPATIENT)
Dept: INFUSION THERAPY | Facility: CLINIC | Age: 74
End: 2024-01-08
Payer: MEDICARE

## 2024-01-08 VITALS
HEART RATE: 60 BPM | RESPIRATION RATE: 16 BRPM | SYSTOLIC BLOOD PRESSURE: 150 MMHG | TEMPERATURE: 96.4 F | OXYGEN SATURATION: 97 % | DIASTOLIC BLOOD PRESSURE: 83 MMHG

## 2024-01-08 DIAGNOSIS — M85.80 OSTEOPENIA, UNSPECIFIED LOCATION: Primary | ICD-10-CM

## 2024-01-08 PROCEDURE — 96365 THER/PROPH/DIAG IV INF INIT: CPT | Performed by: REGISTERED NURSE

## 2024-01-08 RX ORDER — EPINEPHRINE 0.3 MG/.3ML
0.3 INJECTION SUBCUTANEOUS EVERY 5 MIN PRN
OUTPATIENT
Start: 2024-01-08

## 2024-01-08 RX ORDER — ZOLEDRONIC ACID 5 MG/100ML
5 INJECTION, SOLUTION INTRAVENOUS ONCE
Status: COMPLETED | OUTPATIENT
Start: 2024-01-08 | End: 2024-01-08

## 2024-01-08 RX ORDER — DIPHENHYDRAMINE HYDROCHLORIDE 50 MG/ML
50 INJECTION INTRAMUSCULAR; INTRAVENOUS AS NEEDED
OUTPATIENT
Start: 2024-01-08

## 2024-01-08 RX ORDER — ALBUTEROL SULFATE 0.83 MG/ML
3 SOLUTION RESPIRATORY (INHALATION) AS NEEDED
OUTPATIENT
Start: 2024-01-08

## 2024-01-08 RX ORDER — FAMOTIDINE 10 MG/ML
20 INJECTION INTRAVENOUS ONCE AS NEEDED
OUTPATIENT
Start: 2024-01-08

## 2024-01-08 RX ORDER — ZOLEDRONIC ACID 5 MG/100ML
5 INJECTION, SOLUTION INTRAVENOUS ONCE
Status: CANCELLED | OUTPATIENT
Start: 2024-01-08

## 2024-01-08 RX ADMIN — ZOLEDRONIC ACID 5 MG: 5 INJECTION, SOLUTION INTRAVENOUS at 10:06

## 2024-01-08 NOTE — PROGRESS NOTES
[facility]   Infusion Clinic Note   Date: 2024   Name: Xochilt Brock  : 1950   MRN: [id number]         Reason for Visit: OP Infusion (RECLAST 5 MG)      Accompanied by:Self   Visit Type:: Infusion   Diagnosis: Osteopenia, unspecified location    Allergies:   Allergies as of 2024 - Reviewed 2024   Allergen Reaction Noted    Codeine Nausea Only and Other 2023      Current Meds has a current medication list which includes the following prescription(s): atorvastatin, biotin, calcium carbonate, carvedilol, cholecalciferol, fluticasone, multivitamin, and zoledronic acid, and the following Facility-Administered Medications: zoledronic acid.        Vitals:  Vitals:    24 0953   BP: 157/83   Pulse: 71   Resp: 16   Temp: 35.8 °C (96.4 °F)   SpO2: 99%      Infusion Pre-procedure Checklist   Allergies reviewed: yes   Medications reviewed: yes   Contraindications to treatment:No   Previous reaction to current treatment:No   Current Health Issues: None   Pain: '    Is the pain different from normal: No   Is the pain tolerable: n/a   Is your Doctor aware: n/a   Contraindications based on patient history: No   Provider notified: No   Labs: Labs reviewed   Fall Risk Screening: Carrillo Fall Risk  History of Falling, Immediate or Within 3 Months: No  Secondary Diagnosis: No  Ambulatory Aid: Walks without aid/bedrest/nurse assist  Intravenous Therapy/Heparin Lock: Yes  Gait/Transferring: Normal/bedrest/immobile  Mental Status: Oriented to own ability  Carrillo Fall Risk Score: 20    Review of Systems   All other systems reviewed and are negative.     Negative for complaint: [x] all other systems have been reviewed and are negative for complaint   Infusion Readiness:   Assessment Concerns Related to Infusion: No  Provider notified: no  Assess patient for the concerns below. Document provider notification as appropriate:  - Does not meet criteria to treat No  - Has an active or  "recent infection with/without current antibiotic use No  - Has recent/planned dental work No  - Has recent/planned surgeries No  - Has recently received or plans to receive vaccinations No  - Has treatment related toxicities No  - Is pregnant (unless noted otherwise) N/A      (Unless otherwise specified on patient specific therapy plan):     TREATMENT CONDITIONS:  Unless otherwise specified on patient specific therapy plan HOLD and notify provider prior to proceeding with treatment if:   o Creatinine clearance LESS THAN 35 mL/Minute  o Corrected serum calcium LESS THAN 8.6 mg/dL   OR   Ionized calcium LESS THAN 1.1 mmol  o Recent (within 4 weeks) or planned invasive dental procedure.    Lab Results   Component Value Date    CREATININE 0.96 01/05/2024      Lab Results   Component Value Date    CALCIUM 9.9 01/05/2024      No results found for: \"NONUHFIRE\", \"IONCA\", \"CCAIO\", \"IONCALVEN\"     CrCl: 63  Corrected calcium: 9.9    Labs reviewed and patient meets treatment conditions? Yes    DRUG SPECIFIC QUESTIONS:  Is the patient taking a Calcium and Vitamin D supplement?  Yes  (Recommended)    Is the patient receiving Zometa or do they have an allergy to Zometa?  No    Is the patient having jaw pain? No            Initiated By: Drea Mayes RN   Time: 10:12 AM     We administered zoledronic acid.      "

## 2024-01-08 NOTE — PATIENT INSTRUCTIONS
Today you received:     RECLAST 5 MG IV INFUSION.    NEXT APPT ONE YEAR.     For:    1. Osteopenia, unspecified location            Please read the  Medication Guide that was given to you and reviewed during todays visit.     (Tell all doctors including dentists that you are taking this medication)     Go to the emergency room or call 911 if:  -You have signs of allergic reaction:   o         Rash, hives, itching.   o         Swollen, blistered, peeling skin.   o         Swelling of face, lips, mouth, tongue or throat.   o         Tightness of chest, trouble breathing, swallowing or talking      Call your doctor:     - If IV / injection site gets red, warm, swollen, itchy or leaks fluid or pus.     (Leave dressing on your IV site for at least 2 hours and keep area clean and dry    - If you get sick or have symptoms of infection or are not feeling well for any reason.    (Wash your hands often, stay away from people who are sick)    - If you have side effects from your medication that do not go away or are bothersome.     (Refer to the teaching your nurse gave you for side effects to call your doctor about)     Common side effects may include:  stuffy nose, headache, feeling tired, muscle aches, upset stomach    - Before receiving any vaccines, Call the [facility] at [phone]     - You get sick, are on antibiotics, have had a recent vaccine, have surgery or dental work and your doctor wants your visit rescheduled.    - You need to cancel and reschedule your visit for any reason. Call at least 2 days before your visit if you need to cancel.     - Your insurance changes before your next visit.    (We will need to get approval from your new insurance. This can take up to two weeks.)     The [facility] is opened Monday thru Friday 8am-8pm and Saturday 8am-4:30pm. We are closed on holidays.     Voice mail will take your call if the center is closed. If you leave a message please  allow 24 hours for a call back during weekdays. If you leave a message on a weekend/holiday, we will call you back the next business day.

## 2024-01-31 DIAGNOSIS — E78.2 MIXED HYPERLIPIDEMIA: ICD-10-CM

## 2024-01-31 DIAGNOSIS — I10 HYPERTENSION, UNSPECIFIED TYPE: ICD-10-CM

## 2024-01-31 RX ORDER — ATORVASTATIN CALCIUM 10 MG/1
10 TABLET, FILM COATED ORAL DAILY
Qty: 90 TABLET | Refills: 3 | Status: SHIPPED | OUTPATIENT
Start: 2024-01-31 | End: 2025-01-30

## 2024-01-31 RX ORDER — CARVEDILOL 6.25 MG/1
6.25 TABLET ORAL
Qty: 180 TABLET | Refills: 3 | Status: CANCELLED | OUTPATIENT
Start: 2024-01-31 | End: 2025-01-30

## 2024-03-08 DIAGNOSIS — I10 HYPERTENSION, UNSPECIFIED TYPE: ICD-10-CM

## 2024-03-08 RX ORDER — CARVEDILOL 6.25 MG/1
6.25 TABLET ORAL
Qty: 180 TABLET | Refills: 3 | Status: SHIPPED | OUTPATIENT
Start: 2024-03-08

## 2024-03-25 ENCOUNTER — OFFICE VISIT (OUTPATIENT)
Dept: PRIMARY CARE | Facility: CLINIC | Age: 74
End: 2024-03-25
Payer: MEDICARE

## 2024-03-25 ENCOUNTER — TELEPHONE (OUTPATIENT)
Dept: PRIMARY CARE | Facility: CLINIC | Age: 74
End: 2024-03-25

## 2024-03-25 VITALS
TEMPERATURE: 97.2 F | SYSTOLIC BLOOD PRESSURE: 158 MMHG | BODY MASS INDEX: 23.58 KG/M2 | DIASTOLIC BLOOD PRESSURE: 95 MMHG | WEIGHT: 155.6 LBS | HEART RATE: 68 BPM | HEIGHT: 68 IN | OXYGEN SATURATION: 98 %

## 2024-03-25 DIAGNOSIS — R53.83 OTHER FATIGUE: Primary | ICD-10-CM

## 2024-03-25 DIAGNOSIS — E78.2 MIXED HYPERLIPIDEMIA: ICD-10-CM

## 2024-03-25 DIAGNOSIS — I72.8 SPLENIC ARTERY ANEURYSM (CMS-HCC): ICD-10-CM

## 2024-03-25 DIAGNOSIS — I10 PRIMARY HYPERTENSION: ICD-10-CM

## 2024-03-25 DIAGNOSIS — N18.31 STAGE 3A CHRONIC KIDNEY DISEASE (MULTI): ICD-10-CM

## 2024-03-25 DIAGNOSIS — B35.1 FUNGAL TOENAIL INFECTION: ICD-10-CM

## 2024-03-25 DIAGNOSIS — E55.9 VITAMIN D DEFICIENCY: ICD-10-CM

## 2024-03-25 DIAGNOSIS — E61.1 IRON DEFICIENCY: ICD-10-CM

## 2024-03-25 DIAGNOSIS — L72.9 CYST OF SKIN: ICD-10-CM

## 2024-03-25 DIAGNOSIS — R73.9 ELEVATED BLOOD SUGAR: ICD-10-CM

## 2024-03-25 DIAGNOSIS — I42.2 FAMILIAL HYPERTROPHIC CARDIOMYOPATHY (MULTI): ICD-10-CM

## 2024-03-25 PROCEDURE — 1158F ADVNC CARE PLAN TLK DOCD: CPT | Performed by: INTERNAL MEDICINE

## 2024-03-25 PROCEDURE — 1036F TOBACCO NON-USER: CPT | Performed by: INTERNAL MEDICINE

## 2024-03-25 PROCEDURE — G2211 COMPLEX E/M VISIT ADD ON: HCPCS | Performed by: INTERNAL MEDICINE

## 2024-03-25 PROCEDURE — 3080F DIAST BP >= 90 MM HG: CPT | Performed by: INTERNAL MEDICINE

## 2024-03-25 PROCEDURE — 3077F SYST BP >= 140 MM HG: CPT | Performed by: INTERNAL MEDICINE

## 2024-03-25 PROCEDURE — 99214 OFFICE O/P EST MOD 30 MIN: CPT | Performed by: INTERNAL MEDICINE

## 2024-03-25 PROCEDURE — 93000 ELECTROCARDIOGRAM COMPLETE: CPT | Performed by: INTERNAL MEDICINE

## 2024-03-25 PROCEDURE — 1159F MED LIST DOCD IN RCRD: CPT | Performed by: INTERNAL MEDICINE

## 2024-03-25 PROCEDURE — 1123F ACP DISCUSS/DSCN MKR DOCD: CPT | Performed by: INTERNAL MEDICINE

## 2024-03-25 RX ORDER — CICLOPIROX 80 MG/ML
SOLUTION TOPICAL NIGHTLY
Qty: 6.6 ML | Refills: 1 | Status: SHIPPED | OUTPATIENT
Start: 2024-03-25

## 2024-03-25 ASSESSMENT — PATIENT HEALTH QUESTIONNAIRE - PHQ9
1. LITTLE INTEREST OR PLEASURE IN DOING THINGS: SEVERAL DAYS
2. FEELING DOWN, DEPRESSED OR HOPELESS: SEVERAL DAYS
10. IF YOU CHECKED OFF ANY PROBLEMS, HOW DIFFICULT HAVE THESE PROBLEMS MADE IT FOR YOU TO DO YOUR WORK, TAKE CARE OF THINGS AT HOME, OR GET ALONG WITH OTHER PEOPLE: NOT DIFFICULT AT ALL
SUM OF ALL RESPONSES TO PHQ9 QUESTIONS 1 AND 2: 2

## 2024-03-25 NOTE — PROGRESS NOTES
"Subjective   Patient ID: Xochilt Brock is a 73 y.o. female who presents for Primary care established (Wants to discuss colonoscopy results /Lump on top of shoulder (right)/Frequent fatigue /).  HPI  Fatigue one month  Toenail is dark no injury  Mole on side  No recent known uri  Lump on shoulder  No cp no sob  Siblings recently dx w cardiomyopathy  Lots of stress at home lately   Review of Systems  Gen:  no fever  HEENT:  no trouble swallowing  CV:  no dyspnea, cyanosis  Lungs:  no shortness of breath  GI:  no constipation, no blood in stool  Vascular:  no edema  Neuro:   no weakness  Skin:  no rash  MS:no joint swelling  Gu:  no urinary complaints  All other systems have been reviewed and are negative for complaint    BP (!) 158/95   Pulse 68   Temp 36.2 °C (97.2 °F) (Temporal)   Ht 1.727 m (5' 8\")   Wt 70.6 kg (155 lb 9.6 oz)   SpO2 98%   BMI 23.66 kg/m²   Objective   Physical Exam  Lab Results   Component Value Date    WBC 5.5 03/26/2024    HGB 14.3 03/26/2024    HCT 44.2 03/26/2024    MCV 96 03/26/2024     03/26/2024     Lab Results   Component Value Date    GLUCOSE 119 (H) 03/26/2024    CALCIUM 10.1 03/26/2024     03/26/2024    K 4.5 03/26/2024    CO2 31 03/26/2024     03/26/2024    BUN 23 03/26/2024    CREATININE 0.95 03/26/2024     Social History     Socioeconomic History    Marital status:      Spouse name: None    Number of children: None    Years of education: None    Highest education level: None   Occupational History    None   Tobacco Use    Smoking status: Never    Smokeless tobacco: Never   Substance and Sexual Activity    Alcohol use: Yes     Comment: social    Drug use: Never    Sexual activity: None   Other Topics Concern    None   Social History Narrative    None     Social Determinants of Health     Financial Resource Strain: Not on file   Food Insecurity: Not on file   Transportation Needs: Not on file   Physical Activity: Not on file   Stress: Not on file "   Social Connections: Not on file   Intimate Partner Violence: Not on file   Housing Stability: Not on file     Family History   Problem Relation Name Age of Onset    No Known Problems Mother          age 99    Alcohol abuse Father      Stroke Father  74    Cardiomyopathy Sister      Alcohol abuse Sister      Cardiomyopathy Brother         General:  Alert and in  NAD  Heent:  tms nl, throat clear.    2 cm smooth mass r shoulder feels like a cyst   Lungs, CTAB  Skin:  no suspicious lesions,  warm and dry  Head :  Normocephalic  Neck/thyroid:  neck supple, full rom, no cervical lymphadenopathy  no thyromegaly  Heart:  RRR  no murmurs  Abdomen:  Normal , bs present, soft, nontender, not distended, no masses palpated  Extremities:  No clubbing, cyanosis, or edema  Neurologic:  Nonfocal  Psych: alert, normal mood          Problem List Items Addressed This Visit       Elevated blood sugar    Relevant Orders    Hemoglobin A1C (Completed)    Comprehensive Metabolic Panel (Completed)    TSH with reflex to Free T4 if abnormal (Completed)    Vitamin D 25-Hydroxy,Total (for eval of Vitamin D levels)    Vitamin B12    Lipid Panel (Completed)    CBC and Auto Differential (Completed)    Ferritin (Completed)    Iron and TIBC (Completed)    Sedimentation Rate (Completed)    Mixed hyperlipidemia    Relevant Orders    Hemoglobin A1C (Completed)    Comprehensive Metabolic Panel (Completed)    TSH with reflex to Free T4 if abnormal (Completed)    Vitamin D 25-Hydroxy,Total (for eval of Vitamin D levels)    Vitamin B12    Lipid Panel (Completed)    CBC and Auto Differential (Completed)    Ferritin (Completed)    Iron and TIBC (Completed)    Sedimentation Rate (Completed)    Hypertension    Overview     1/25/23 On coreg. To follow up in 2-4 weeks with cuff and readings.         Relevant Orders    Hemoglobin A1C (Completed)    Comprehensive Metabolic Panel (Completed)    TSH with reflex to Free T4 if abnormal (Completed)    Vitamin D  25-Hydroxy,Total (for eval of Vitamin D levels)    Vitamin B12    Lipid Panel (Completed)    CBC and Auto Differential (Completed)    Ferritin (Completed)    Iron and TIBC (Completed)    Sedimentation Rate (Completed)    Vitamin D deficiency    Relevant Orders    Vitamin D 25-Hydroxy,Total (for eval of Vitamin D levels)    CKD (chronic kidney disease) stage 3, GFR 30-59 ml/min (CMS/HCC)    Overview     Continue current medications.   No new symptoms,stable condition.  Improved on last labs   Follow up at least yearly.  Need to have better bp control, usually she does  Check at home call w numbers            Splenic artery aneurysm (CMS/HCC)    Overview       No new symptoms  stable condition.   Follow up at least yearly.    1.  Stable CT appearance of 9 mm diameter calcified focus within the  splenic hilum likely representing a densely calcified splenic artery  aneurysm.  2. Interval enlargement of multiple hepatic cysts, the largest  measuring 3.9 cm in diameter.  Sees dr maldonado   10/23             Other Visit Diagnoses       Other fatigue    -  Primary    Relevant Orders    Hemoglobin A1C (Completed)    Comprehensive Metabolic Panel (Completed)    TSH with reflex to Free T4 if abnormal (Completed)    Vitamin D 25-Hydroxy,Total (for eval of Vitamin D levels)    Vitamin B12    Lipid Panel (Completed)    CBC and Auto Differential (Completed)    Ferritin (Completed)    Iron and TIBC (Completed)    Sedimentation Rate (Completed)    CK (Completed)    Iron deficiency        Relevant Orders    Ferritin (Completed)    Iron and TIBC (Completed)    Familial hypertrophic cardiomyopathy (CMS/HCC)        Relevant Orders    Transthoracic Echo (TTE) Complete    ECG 12 Lead (Completed)    Cyst of skin        rassurance fu if sx worsen  likely ganglion cyst    Fungal toenail infection        Relevant Medications    ciclopirox (Penlac) 8 % solution          Chronic conditions reviewed in the assessment and plan.    Continue  medications unless specified otherwise.  Previous labs reviewed.   Other specialty provider notes reviewed.     Follow up in 6 months or prn   For mcw

## 2024-03-25 NOTE — TELEPHONE ENCOUNTER
----- Message from Aida Santos MD sent at 3/25/2024  1:08 PM EDT -----  Check bp at home , call with numbers after one week

## 2024-03-26 ENCOUNTER — TELEPHONE (OUTPATIENT)
Dept: PRIMARY CARE | Facility: CLINIC | Age: 74
End: 2024-03-26

## 2024-03-26 ENCOUNTER — LAB (OUTPATIENT)
Dept: LAB | Facility: LAB | Age: 74
End: 2024-03-26
Payer: MEDICARE

## 2024-03-26 DIAGNOSIS — E55.9 VITAMIN D DEFICIENCY: ICD-10-CM

## 2024-03-26 DIAGNOSIS — I10 PRIMARY HYPERTENSION: ICD-10-CM

## 2024-03-26 DIAGNOSIS — E61.1 IRON DEFICIENCY: ICD-10-CM

## 2024-03-26 DIAGNOSIS — R73.9 ELEVATED BLOOD SUGAR: ICD-10-CM

## 2024-03-26 DIAGNOSIS — R53.83 OTHER FATIGUE: ICD-10-CM

## 2024-03-26 DIAGNOSIS — E78.2 MIXED HYPERLIPIDEMIA: ICD-10-CM

## 2024-03-26 PROBLEM — I72.8 SPLENIC ARTERY ANEURYSM (CMS-HCC): Status: ACTIVE | Noted: 2024-03-26

## 2024-03-26 LAB
25(OH)D3 SERPL-MCNC: 38 NG/ML (ref 30–100)
ALBUMIN SERPL BCP-MCNC: 4.3 G/DL (ref 3.4–5)
ALP SERPL-CCNC: 41 U/L (ref 33–136)
ALT SERPL W P-5'-P-CCNC: 21 U/L (ref 7–45)
ANION GAP SERPL CALC-SCNC: 10 MMOL/L (ref 10–20)
AST SERPL W P-5'-P-CCNC: 20 U/L (ref 9–39)
BASOPHILS # BLD AUTO: 0.11 X10*3/UL (ref 0–0.1)
BASOPHILS NFR BLD AUTO: 2 %
BILIRUB SERPL-MCNC: 1.4 MG/DL (ref 0–1.2)
BUN SERPL-MCNC: 23 MG/DL (ref 6–23)
CALCIUM SERPL-MCNC: 10.1 MG/DL (ref 8.6–10.3)
CHLORIDE SERPL-SCNC: 104 MMOL/L (ref 98–107)
CHOLEST SERPL-MCNC: 170 MG/DL (ref 0–199)
CHOLESTEROL/HDL RATIO: 3
CK SERPL-CCNC: 82 U/L (ref 0–215)
CO2 SERPL-SCNC: 31 MMOL/L (ref 21–32)
CREAT SERPL-MCNC: 0.95 MG/DL (ref 0.5–1.05)
EGFRCR SERPLBLD CKD-EPI 2021: 64 ML/MIN/1.73M*2
EOSINOPHIL # BLD AUTO: 0.19 X10*3/UL (ref 0–0.4)
EOSINOPHIL NFR BLD AUTO: 3.4 %
ERYTHROCYTE [DISTWIDTH] IN BLOOD BY AUTOMATED COUNT: 12.5 % (ref 11.5–14.5)
ERYTHROCYTE [SEDIMENTATION RATE] IN BLOOD BY WESTERGREN METHOD: 3 MM/H (ref 0–30)
EST. AVERAGE GLUCOSE BLD GHB EST-MCNC: 105 MG/DL
FERRITIN SERPL-MCNC: 67 NG/ML (ref 8–150)
GLUCOSE SERPL-MCNC: 119 MG/DL (ref 74–99)
HBA1C MFR BLD: 5.3 %
HCT VFR BLD AUTO: 44.2 % (ref 36–46)
HDLC SERPL-MCNC: 56.2 MG/DL
HGB BLD-MCNC: 14.3 G/DL (ref 12–16)
IMM GRANULOCYTES # BLD AUTO: 0.02 X10*3/UL (ref 0–0.5)
IMM GRANULOCYTES NFR BLD AUTO: 0.4 % (ref 0–0.9)
IRON SATN MFR SERPL: 48 % (ref 25–45)
IRON SERPL-MCNC: 157 UG/DL (ref 35–150)
LDLC SERPL CALC-MCNC: 85 MG/DL
LYMPHOCYTES # BLD AUTO: 1.87 X10*3/UL (ref 0.8–3)
LYMPHOCYTES NFR BLD AUTO: 33.9 %
MCH RBC QN AUTO: 31 PG (ref 26–34)
MCHC RBC AUTO-ENTMCNC: 32.4 G/DL (ref 32–36)
MCV RBC AUTO: 96 FL (ref 80–100)
MONOCYTES # BLD AUTO: 0.64 X10*3/UL (ref 0.05–0.8)
MONOCYTES NFR BLD AUTO: 11.6 %
NEUTROPHILS # BLD AUTO: 2.69 X10*3/UL (ref 1.6–5.5)
NEUTROPHILS NFR BLD AUTO: 48.7 %
NON HDL CHOLESTEROL: 114 MG/DL (ref 0–149)
NRBC BLD-RTO: 0 /100 WBCS (ref 0–0)
PLATELET # BLD AUTO: 257 X10*3/UL (ref 150–450)
POTASSIUM SERPL-SCNC: 4.5 MMOL/L (ref 3.5–5.3)
PROT SERPL-MCNC: 7 G/DL (ref 6.4–8.2)
RBC # BLD AUTO: 4.61 X10*6/UL (ref 4–5.2)
SODIUM SERPL-SCNC: 140 MMOL/L (ref 136–145)
TIBC SERPL-MCNC: 326 UG/DL (ref 240–445)
TRIGL SERPL-MCNC: 144 MG/DL (ref 0–149)
TSH SERPL-ACNC: 1.29 MIU/L (ref 0.44–3.98)
UIBC SERPL-MCNC: 169 UG/DL (ref 110–370)
VIT B12 SERPL-MCNC: 681 PG/ML (ref 211–911)
VLDL: 29 MG/DL (ref 0–40)
WBC # BLD AUTO: 5.5 X10*3/UL (ref 4.4–11.3)

## 2024-03-26 PROCEDURE — 85025 COMPLETE CBC W/AUTO DIFF WBC: CPT

## 2024-03-26 PROCEDURE — 82550 ASSAY OF CK (CPK): CPT

## 2024-03-26 PROCEDURE — 83550 IRON BINDING TEST: CPT

## 2024-03-26 PROCEDURE — 82728 ASSAY OF FERRITIN: CPT

## 2024-03-26 PROCEDURE — 82306 VITAMIN D 25 HYDROXY: CPT

## 2024-03-26 PROCEDURE — 80053 COMPREHEN METABOLIC PANEL: CPT

## 2024-03-26 PROCEDURE — 83540 ASSAY OF IRON: CPT

## 2024-03-26 PROCEDURE — 84443 ASSAY THYROID STIM HORMONE: CPT

## 2024-03-26 PROCEDURE — 85652 RBC SED RATE AUTOMATED: CPT

## 2024-03-26 PROCEDURE — 80061 LIPID PANEL: CPT

## 2024-03-26 PROCEDURE — 36415 COLL VENOUS BLD VENIPUNCTURE: CPT

## 2024-03-26 PROCEDURE — 83036 HEMOGLOBIN GLYCOSYLATED A1C: CPT

## 2024-03-26 PROCEDURE — 82607 VITAMIN B-12: CPT

## 2024-03-26 NOTE — TELEPHONE ENCOUNTER
SOLOMON  Spoke to patient and patient stated that she saw the vascular doctor and he said to see him every 4 years.  Patient stated she took her BP last night and it was 121/60.   Patient stated she will continue to take BP readings for a week and call office with the results.

## 2024-03-26 NOTE — TELEPHONE ENCOUNTER
----- Message from Aida Santos MD sent at 3/26/2024  4:42 PM EDT -----  Did she see vascular for splenic artery aneurysm  Also ask how her bp is at home

## 2024-03-27 DIAGNOSIS — E78.2 MIXED HYPERLIPIDEMIA: ICD-10-CM

## 2024-03-27 DIAGNOSIS — K76.89 LIVER CYST: ICD-10-CM

## 2024-03-27 DIAGNOSIS — R73.9 ELEVATED BLOOD SUGAR: ICD-10-CM

## 2024-03-27 DIAGNOSIS — E61.1 IRON DEFICIENCY: ICD-10-CM

## 2024-03-28 ENCOUNTER — HOSPITAL ENCOUNTER (OUTPATIENT)
Dept: CARDIOLOGY | Facility: CLINIC | Age: 74
Discharge: HOME | End: 2024-03-28
Payer: MEDICARE

## 2024-03-28 DIAGNOSIS — R53.83 OTHER FATIGUE: ICD-10-CM

## 2024-03-28 DIAGNOSIS — I42.2 FAMILIAL HYPERTROPHIC CARDIOMYOPATHY (MULTI): ICD-10-CM

## 2024-03-28 LAB
AORTIC VALVE PEAK VELOCITY: 1.02 M/S
AV PEAK GRADIENT: 4.2 MMHG
AVA (PEAK VEL): 2.51 CM2
EJECTION FRACTION APICAL 4 CHAMBER: 67.7
EJECTION FRACTION: 65 %
LEFT ATRIUM VOLUME AREA LENGTH INDEX BSA: 23.6 ML/M2
LEFT VENTRICLE INTERNAL DIMENSION DIASTOLE: 4.48 CM (ref 3.5–6)
LEFT VENTRICULAR OUTFLOW TRACT DIAMETER: 2.12 CM
MITRAL VALVE E/A RATIO: 1.07
MITRAL VALVE E/E' RATIO: 10.44
RIGHT VENTRICLE FREE WALL PEAK S': 11 CM/S
RIGHT VENTRICLE PEAK SYSTOLIC PRESSURE: 25.7 MMHG
TRICUSPID ANNULAR PLANE SYSTOLIC EXCURSION: 2.3 CM

## 2024-03-28 PROCEDURE — 93306 TTE W/DOPPLER COMPLETE: CPT

## 2024-03-28 PROCEDURE — 93306 TTE W/DOPPLER COMPLETE: CPT | Performed by: INTERNAL MEDICINE

## 2024-03-29 PROBLEM — S06.5XAA SUBDURAL HEMATOMA (MULTI): Status: ACTIVE | Noted: 2024-03-29

## 2024-03-29 PROBLEM — E61.1 IRON DEFICIENCY: Status: ACTIVE | Noted: 2024-03-26

## 2024-03-29 PROBLEM — M25.519 SHOULDER PAIN: Status: RESOLVED | Noted: 2023-02-05 | Resolved: 2024-03-29

## 2024-03-29 PROBLEM — L72.9 CYST OF SKIN: Status: ACTIVE | Noted: 2024-03-29

## 2024-03-29 PROBLEM — I42.2 HYPERTROPHIC CARDIOMYOPATHY (MULTI): Status: ACTIVE | Noted: 2024-03-29

## 2024-03-29 PROBLEM — L65.9 ALOPECIA: Status: RESOLVED | Noted: 2023-02-05 | Resolved: 2024-03-29

## 2024-03-29 PROBLEM — H61.21 EXCESSIVE CERUMEN IN EAR CANAL, RIGHT: Status: RESOLVED | Noted: 2023-02-05 | Resolved: 2024-03-29

## 2024-03-29 PROBLEM — M79.643 HAND PAIN: Status: RESOLVED | Noted: 2023-02-05 | Resolved: 2024-03-29

## 2024-03-29 PROBLEM — L65.9 HAIR LOSS: Status: RESOLVED | Noted: 2023-02-05 | Resolved: 2024-03-29

## 2024-03-29 PROBLEM — R13.10 DYSPHAGIA: Status: RESOLVED | Noted: 2023-02-05 | Resolved: 2024-03-29

## 2024-03-29 PROBLEM — S91.209A AVULSION OF TOENAIL: Status: ACTIVE | Noted: 2024-03-29

## 2024-03-29 PROBLEM — H61.23 BILATERAL IMPACTED CERUMEN: Status: RESOLVED | Noted: 2023-02-05 | Resolved: 2024-03-29

## 2024-03-29 PROBLEM — R53.83 FATIGUE: Status: ACTIVE | Noted: 2024-03-26

## 2024-03-29 PROBLEM — J30.9 ALLERGIC RHINITIS: Status: ACTIVE | Noted: 2024-03-29

## 2024-04-04 ENCOUNTER — TELEPHONE (OUTPATIENT)
Dept: PRIMARY CARE | Facility: CLINIC | Age: 74
End: 2024-04-04
Payer: MEDICARE

## 2024-04-04 NOTE — TELEPHONE ENCOUNTER
Last week of bp reading taken same time every evening  121/60  149/71  134/65  134/66  137/71  137/69  155/84

## 2024-04-06 ENCOUNTER — HOSPITAL ENCOUNTER (OUTPATIENT)
Dept: RADIOLOGY | Facility: CLINIC | Age: 74
Discharge: HOME | End: 2024-04-06
Payer: MEDICARE

## 2024-04-06 DIAGNOSIS — K76.89 LIVER CYST: ICD-10-CM

## 2024-04-06 PROCEDURE — 76705 ECHO EXAM OF ABDOMEN: CPT | Performed by: RADIOLOGY

## 2024-04-06 PROCEDURE — 76705 ECHO EXAM OF ABDOMEN: CPT

## 2024-05-06 ENCOUNTER — LAB (OUTPATIENT)
Dept: LAB | Facility: LAB | Age: 74
End: 2024-05-06
Payer: MEDICARE

## 2024-05-06 DIAGNOSIS — K76.89 LIVER CYST: ICD-10-CM

## 2024-05-06 DIAGNOSIS — R73.9 ELEVATED BLOOD SUGAR: ICD-10-CM

## 2024-05-06 DIAGNOSIS — E61.1 IRON DEFICIENCY: ICD-10-CM

## 2024-05-06 DIAGNOSIS — E78.2 MIXED HYPERLIPIDEMIA: ICD-10-CM

## 2024-05-06 LAB
ALBUMIN SERPL BCP-MCNC: 4.3 G/DL (ref 3.4–5)
ALP SERPL-CCNC: 46 U/L (ref 33–136)
ALT SERPL W P-5'-P-CCNC: 20 U/L (ref 7–45)
AST SERPL W P-5'-P-CCNC: 21 U/L (ref 9–39)
BASOPHILS # BLD AUTO: 0.09 X10*3/UL (ref 0–0.1)
BASOPHILS NFR BLD AUTO: 1.9 %
BILIRUB DIRECT SERPL-MCNC: 0.2 MG/DL (ref 0–0.3)
BILIRUB SERPL-MCNC: 1.2 MG/DL (ref 0–1.2)
EOSINOPHIL # BLD AUTO: 0.21 X10*3/UL (ref 0–0.4)
EOSINOPHIL NFR BLD AUTO: 4.3 %
ERYTHROCYTE [DISTWIDTH] IN BLOOD BY AUTOMATED COUNT: 12.1 % (ref 11.5–14.5)
FERRITIN SERPL-MCNC: 72 NG/ML (ref 8–150)
HCT VFR BLD AUTO: 43.4 % (ref 36–46)
HGB BLD-MCNC: 14.3 G/DL (ref 12–16)
IMM GRANULOCYTES # BLD AUTO: 0.02 X10*3/UL (ref 0–0.5)
IMM GRANULOCYTES NFR BLD AUTO: 0.4 % (ref 0–0.9)
IRON SATN MFR SERPL: 29 % (ref 25–45)
IRON SERPL-MCNC: 89 UG/DL (ref 35–150)
LYMPHOCYTES # BLD AUTO: 1.73 X10*3/UL (ref 0.8–3)
LYMPHOCYTES NFR BLD AUTO: 35.8 %
MCH RBC QN AUTO: 31.1 PG (ref 26–34)
MCHC RBC AUTO-ENTMCNC: 32.9 G/DL (ref 32–36)
MCV RBC AUTO: 94 FL (ref 80–100)
MONOCYTES # BLD AUTO: 0.5 X10*3/UL (ref 0.05–0.8)
MONOCYTES NFR BLD AUTO: 10.4 %
NEUTROPHILS # BLD AUTO: 2.28 X10*3/UL (ref 1.6–5.5)
NEUTROPHILS NFR BLD AUTO: 47.2 %
NRBC BLD-RTO: 0 /100 WBCS (ref 0–0)
PLATELET # BLD AUTO: 241 X10*3/UL (ref 150–450)
PROT SERPL-MCNC: 6.9 G/DL (ref 6.4–8.2)
RBC # BLD AUTO: 4.6 X10*6/UL (ref 4–5.2)
TIBC SERPL-MCNC: 312 UG/DL (ref 240–445)
UIBC SERPL-MCNC: 223 UG/DL (ref 110–370)
WBC # BLD AUTO: 4.8 X10*3/UL (ref 4.4–11.3)

## 2024-05-06 PROCEDURE — 83540 ASSAY OF IRON: CPT

## 2024-05-06 PROCEDURE — 85025 COMPLETE CBC W/AUTO DIFF WBC: CPT

## 2024-05-06 PROCEDURE — 36415 COLL VENOUS BLD VENIPUNCTURE: CPT

## 2024-05-06 PROCEDURE — 83550 IRON BINDING TEST: CPT

## 2024-05-06 PROCEDURE — 82728 ASSAY OF FERRITIN: CPT

## 2024-05-06 PROCEDURE — 80076 HEPATIC FUNCTION PANEL: CPT

## 2024-07-01 ENCOUNTER — APPOINTMENT (OUTPATIENT)
Dept: NEUROLOGY | Facility: CLINIC | Age: 74
End: 2024-07-01
Payer: MEDICARE

## 2024-07-01 VITALS
HEART RATE: 65 BPM | BODY MASS INDEX: 23.49 KG/M2 | DIASTOLIC BLOOD PRESSURE: 82 MMHG | HEIGHT: 68 IN | WEIGHT: 155 LBS | SYSTOLIC BLOOD PRESSURE: 152 MMHG

## 2024-07-01 DIAGNOSIS — G25.0 ESSENTIAL TREMOR: Primary | ICD-10-CM

## 2024-07-01 PROCEDURE — 3077F SYST BP >= 140 MM HG: CPT | Performed by: PSYCHIATRY & NEUROLOGY

## 2024-07-01 PROCEDURE — 1159F MED LIST DOCD IN RCRD: CPT | Performed by: PSYCHIATRY & NEUROLOGY

## 2024-07-01 PROCEDURE — 3079F DIAST BP 80-89 MM HG: CPT | Performed by: PSYCHIATRY & NEUROLOGY

## 2024-07-01 PROCEDURE — 99203 OFFICE O/P NEW LOW 30 MIN: CPT | Performed by: PSYCHIATRY & NEUROLOGY

## 2024-07-01 PROCEDURE — 1123F ACP DISCUSS/DSCN MKR DOCD: CPT | Performed by: PSYCHIATRY & NEUROLOGY

## 2024-07-01 PROCEDURE — 1036F TOBACCO NON-USER: CPT | Performed by: PSYCHIATRY & NEUROLOGY

## 2024-07-01 ASSESSMENT — ENCOUNTER SYMPTOMS
DEPRESSION: 0
OCCASIONAL FEELINGS OF UNSTEADINESS: 0
LOSS OF SENSATION IN FEET: 0

## 2024-07-01 ASSESSMENT — ANXIETY QUESTIONNAIRES
2. NOT BEING ABLE TO STOP OR CONTROL WORRYING: NOT AT ALL
1. FEELING NERVOUS, ANXIOUS, OR ON EDGE: NOT AT ALL
GAD7 TOTAL SCORE: 0
5. BEING SO RESTLESS THAT IT IS HARD TO SIT STILL: NOT AT ALL
7. FEELING AFRAID AS IF SOMETHING AWFUL MIGHT HAPPEN: NOT AT ALL
6. BECOMING EASILY ANNOYED OR IRRITABLE: NOT AT ALL
IF YOU CHECKED OFF ANY PROBLEMS ON THIS QUESTIONNAIRE, HOW DIFFICULT HAVE THESE PROBLEMS MADE IT FOR YOU TO DO YOUR WORK, TAKE CARE OF THINGS AT HOME, OR GET ALONG WITH OTHER PEOPLE: NOT DIFFICULT AT ALL
4. TROUBLE RELAXING: NOT AT ALL
3. WORRYING TOO MUCH ABOUT DIFFERENT THINGS: NOT AT ALL

## 2024-07-01 ASSESSMENT — PATIENT HEALTH QUESTIONNAIRE - PHQ9
2. FEELING DOWN, DEPRESSED OR HOPELESS: NOT AT ALL
1. LITTLE INTEREST OR PLEASURE IN DOING THINGS: NOT AT ALL
SUM OF ALL RESPONSES TO PHQ9 QUESTIONS 1 AND 2: 0

## 2024-07-01 NOTE — PROGRESS NOTES
Subjective     Xochilt Brock is a right handed  73 y.o. year old female who presents with Tremors.   Visit type: provider referral: Jose Luis Godfrey  retired    Patient diagnosed with ET about 5 years ago. Was never on medication for it. Noticed progression of tremor to involve head about 2 years ago(noticed by family members) and wanted to get it checked.  States that 5 years ago noticed shaking of R hand. Sometimes affect hand writing or other meticulous tasks like applying makeup. Worsens as the day goes back. Head tremor does not interfere with any activity. Worsened by stress, improves with alcohol. Still does not feel like she needs medications for it. Just wanted to make sure that she should not be concerned. No blood relative with PD.    Also reports tightness sensation at top of head (feels like having a tight pony tail). Started about 2 years ago. Is episodic typically at night and goes away by next morning. Does not feel like she needs medication for it but just wants to know what it might. Gets it a couple times a month. Has not had one in about 3 weeks. Has not tried any medications. Just massages the area.    Denies any fall, freezing of gait, can keep up w peers when walking, no changes in voice, constipation, no changes in vision, denies acting out dreams, denies urinary symptoms, reports good sense of smell and taste.      TSH and iron checked within past 6 months and wnl.    Patient Active Problem List   Diagnosis    Elevated blood sugar    Essential tremor    Gastroesophageal reflux disease    Hamstring strain    Hematuria    Hip pain, left    Hip pain, right    Mixed hyperlipidemia    Hypertension    Impaired memory    Postconcussion syndrome    Knee osteoarthritis    Knee pain    Liver function study, abnormal    Low back pain    Osteopenia    Other diseases of vocal cords    Scalp pain    Sciatica    Gastrointestinal symptom    Sore throat    Vitamin D deficiency    Watery stools    Weakness of trunk  musculature    Sinusitis    COVID-19    CKD (chronic kidney disease) stage 3, GFR 30-59 ml/min (Multi)    Closed head injury    Sacroiliac joint dysfunction of right side    UPJ (ureteropelvic junction) obstruction    Splenic artery aneurysm (CMS-HCC)    Allergic rhinitis    Avulsion of toenail    Cyst of skin    Fatigue    Hypertrophic cardiomyopathy (Multi)    Iron deficiency    Subdural hematoma (Multi)      Past Medical History:   Diagnosis Date    Abscess of eyelid right eye, unspecified eyelid 06/15/2020    Cellulitis of right eyelid    Age-related osteoporosis without current pathological fracture 02/22/2021    Age related osteoporosis    Body mass index (BMI) 24.0-24.9, adult 11/18/2020    Body mass index (BMI) of 24.0 to 24.9 in adult    Chronic sinusitis, unspecified 03/02/2021    Other sinusitis    Encounter for other screening for malignant neoplasm of breast 02/22/2021    Breast screening    Encounter for screening for depression 11/18/2020    Depression screen    Encounter for screening for malignant neoplasm of cervix 11/18/2020    Cervical cancer screening    Headache, unspecified 02/23/2022    Scalp pain    Personal history of other diseases of the circulatory system 03/02/2021    History of subdural hematoma    Personal history of other diseases of the respiratory system 11/13/2018    History of allergic rhinitis    Personal history of other medical treatment     H/O bone density study      Past Surgical History:   Procedure Laterality Date    CT ABDOMEN PELVIS ANGIOGRAM W AND/OR WO IV CONTRAST  10/11/2023    CT ABDOMEN PELVIS ANGIOGRAM W AND/OR WO IV CONTRAST 10/11/2023 [id number] CT    OTHER SURGICAL HISTORY  11/13/2018    Kidney surgery    OTHER SURGICAL HISTORY  11/13/2018    Rotator cuff repair    OTHER SURGICAL HISTORY  11/13/2018    Nephrectomy    OTHER SURGICAL HISTORY  2012    Colonoscopy 2023 polyp      Social History     Socioeconomic History    Marital status:      Spouse  name: Not on file    Number of children: Not on file    Years of education: Not on file    Highest education level: Not on file   Occupational History    Not on file   Tobacco Use    Smoking status: Never    Smokeless tobacco: Never   Substance and Sexual Activity    Alcohol use: Yes     Comment: social    Drug use: Never    Sexual activity: Not on file   Other Topics Concern    Not on file   Social History Narrative    Not on file     Social Determinants of Health     Financial Resource Strain: Not on file   Food Insecurity: Not on file   Transportation Needs: Not on file   Physical Activity: Not on file   Stress: Not on file   Social Connections: Not on file   Intimate Partner Violence: Not on file   Housing Stability: Not on file      Family History   Problem Relation Name Age of Onset    No Known Problems Mother          age 99    Alcohol abuse Father      Stroke Father  74    Cardiomyopathy Sister      Alcohol abuse Sister      Cardiomyopathy Brother        Patient Health Questionnaire-2 Score: 0          Review of Systems  All other system have been reviewed and are negative for complaint.  Objective   Neurological Exam  General Appearance:  No distress, alert, interactive and cooperative.     Cardiovascular: Regular, rate and rhythm, no murmurs, normal S1 and S2. Carotid pulses intact without any bruits. Pulses +2 and equal in all extremities. No swelling, varicosities, edema, or tenderness to palpation.      Mental State: Orientation was normal to time, place and person. Recent and remote memory was intact.  Attention span and concentration were normal. Language testing was normal for comprehension, repetition, expression, and naming. The patient could correctly interpret a picture. General fund of knowledge was intact.     Ophthalmoscopic: The ophthalmoscopic exam normal. The fundi were well visualized with normal disc margins, clear vessels and vascular pulsations. No disc edema. The cup/disk ratio was not  enlarged. No hemorrhages or exudates were present in the posterior segments that were visualized.     Cranial Nerves:   CN: Visual fields full to confrontation.   CN 3, 4, 6: Pupils round, 4 mm in diameter, equally reactive to light. Lids symmetric; no ptosis. EOMs normal alignment, full range with normal saccades, pursuit and convergence.   No nystagmus.   CN 5: Facial sensation intact bilaterally.   CN 7: Normal and symmetric facial strength. Nasolabial folds symmetric.   CN 8: Hearing intact to voice  CN 9: Palate elevates symmetrically.   CN 11: Normal strength of shoulder shrug and neck turning.   CN 12: Tongue midline, with normal bulk and strength; no fasciculations.     Motor: Muscle bulk and tone were normal in both upper and lower extremities. No fasciculations, tremor or other abnormal movements were present. Muscle strength was 5/5 in distal and proximal muscles in both upper and lower extremities.     Action tremor involving R hand >L hand and head (+lower jaw). Normal tone, no bradykinesia, no rest tremor.    Reflexes: Right/ Left:  Biceps 2/2, brachioradialis 2/2, triceps 2/2, patellar 2/2, ankle 2/2  Babinski: toes downgoing to plantar stimulation. No clonus, frontal release signs or other pathologic reflexes present.     Sensory: In both upper and lower extremities, sensation was intact to light touch    Coordination: In both upper extremities, finger-nose-finger was intact without dysmetria or overshoot (action tremor present R>L). In both lower extremities, heel-to-shin was intact. JENNIE were intact in both upper and lower extremities.      Gait: Station was stable with a normal base. Gait was stable with a normal arm swing and speed. No ataxia, shuffling, steppage or waddling was present. No circumduction was present. Tandem gait was intact. No Romberg sign was present.        Movement specific examination:  Normal tone, no bradykinesia. No resting tremor. Gait is brisk with normal arm swing.    High frequency low amplitude postural and kinetic tremor.   Archimedes spirals mildly tremulous.                 Hemoglobin A1C   Date Value Ref Range Status   03/26/2024 5.3 see below % Final     Estimated Average Glucose   Date Value Ref Range Status   03/26/2024 105 Not Established mg/dL Final     Thyroid Stimulating Hormone   Date Value Ref Range Status   03/26/2024 1.29 0.44 - 3.98 mIU/L Final         Assessment/Plan     Xochilt Brock is a 73 y.o. year old female with hx on HTN (on Coreg), here to establish care for follow up of ET. Diagnosed around 2018 with R hand tremor and in past 2 years noted involvement of head. No red flag on exam and pt was reassured that no evidence of emerging parkinsonism. Agreeable to hold off medication for now as it does not interfere with daily activities. Patient to reach out to our office if she would like to start medication. Follow up in 1 year.  She was advised to get information from [employer]ential [employer] foundation as a great online resource.       Treatment's for essential tremor include several pharmacological agents. The American Academy of Neurology guidelines for essential tremor, gives Level A evidence to the use of Propranolol and primidone. There is level B evidence for gabapentin, topamax and benzodiazepines. (Sangeeta et al Neurology 2005, with update in 2011).     She is currently on coreg, so did not start propranolol prn.     I saw and evaluated the patient. I personally obtained the key and critical portions of the history and physical exam or was physically present for key and critical portions performed by the resident/fellow. I reviewed the resident/fellow's documentation and discussed the patient with the resident/fellow. I agree with the resident/fellow's medical decision making as documented in the note.    Hilaria Estevez MD       For the Evaluation and Management of this patient, the level of Medical Decision Making for this visit was  determined based on the following:    The level of COMPLEXITY AND NUMBER OF PROBLEMS ADDRESSED was [MODERATE,as determined by:   .  MODERATE:  undiagnosed new problem with uncertain prognosis.      The AMOUNT/COMPLEXITY OF DATA TO REVIEW (reviewed, ordered or call for) was [LIMITED] as determined by:    LIMITED:  my review of prior external notes from a unique source.      The level of RISK OF COMPLICATIONS was [ LOW] as determined by:    LOW:  A low risk of morbidity from additional diagnostic testing or treatment.      Thus, the level of medical decision making (based on the lower of the two highest elements) was determined to be LOW]. Therefore the appropriate E/M code for this encounter is [83496

## 2024-07-01 NOTE — LETTER
July 1, 2024     Aida Santos MD  [address]   [facility]  [facility] [address]    Patient: Xochilt Brock   YOB: 1950   Date of Visit: 7/1/2024       Dear Dr. Aida Santos MD:    Thank you for referring Xochilt Brock to me for evaluation. Below are my notes for this consultation.  If you have questions, please do not hesitate to call me. I look forward to following your patient along with you.       Sincerely,     Hilaria Estevez MD      CC: No Recipients  ______________________________________________________________________________________    Subjective    Xochilt Brock is a right handed  73 y.o. year old female who presents with Tremors.   Visit type: provider referral: Jose Luis Godfrey  retired    Patient diagnosed with ET about 5 years ago. Was never on medication for it. Noticed progression of tremor to involve head about 2 years ago(noticed by family members) and wanted to get it checked.  States that 5 years ago noticed shaking of R hand. Sometimes affect hand writing or other meticulous tasks like applying makeup. Worsens as the day goes back. Head tremor does not interfere with any activity. Worsened by stress, improves with alcohol. Still does not feel like she needs medications for it. Just wanted to make sure that she should not be concerned. No blood relative with PD.    Also reports tightness sensation at top of head (feels like having a tight pony tail). Started about 2 years ago. Is episodic typically at night and goes away by next morning. Does not feel like she needs medication for it but just wants to know what it might. Gets it a couple times a month. Has not had one in about 3 weeks. Has not tried any medications. Just massages the area.    Denies any fall, freezing of gait, can keep up w peers when walking, no changes in voice, constipation, no changes in vision, denies acting out dreams, denies urinary symptoms, reports good sense of smell and  taste.      TSH and iron checked within past 6 months and wnl.    Patient Active Problem List   Diagnosis   • Elevated blood sugar   • Essential tremor   • Gastroesophageal reflux disease   • Hamstring strain   • Hematuria   • Hip pain, left   • Hip pain, right   • Mixed hyperlipidemia   • Hypertension   • Impaired memory   • Postconcussion syndrome   • Knee osteoarthritis   • Knee pain   • Liver function study, abnormal   • Low back pain   • Osteopenia   • Other diseases of vocal cords   • Scalp pain   • Sciatica   • Gastrointestinal symptom   • Sore throat   • Vitamin D deficiency   • Watery stools   • Weakness of trunk musculature   • Sinusitis   • COVID-19   • CKD (chronic kidney disease) stage 3, GFR 30-59 ml/min (Multi)   • Closed head injury   • Sacroiliac joint dysfunction of right side   • UPJ (ureteropelvic junction) obstruction   • Splenic artery aneurysm (CMS-HCC)   • Allergic rhinitis   • Avulsion of toenail   • Cyst of skin   • Fatigue   • Hypertrophic cardiomyopathy (Multi)   • Iron deficiency   • Subdural hematoma (Multi)      Past Medical History:   Diagnosis Date   • Abscess of eyelid right eye, unspecified eyelid 06/15/2020    Cellulitis of right eyelid   • Age-related osteoporosis without current pathological fracture 02/22/2021    Age related osteoporosis   • Body mass index (BMI) 24.0-24.9, adult 11/18/2020    Body mass index (BMI) of 24.0 to 24.9 in adult   • Chronic sinusitis, unspecified 03/02/2021    Other sinusitis   • Encounter for other screening for malignant neoplasm of breast 02/22/2021    Breast screening   • Encounter for screening for depression 11/18/2020    Depression screen   • Encounter for screening for malignant neoplasm of cervix 11/18/2020    Cervical cancer screening   • Headache, unspecified 02/23/2022    Scalp pain   • Personal history of other diseases of the circulatory system 03/02/2021    History of subdural hematoma   • Personal history of other diseases of the  respiratory system 11/13/2018    History of allergic rhinitis   • Personal history of other medical treatment     H/O bone density study      Past Surgical History:   Procedure Laterality Date   • CT ABDOMEN PELVIS ANGIOGRAM W AND/OR WO IV CONTRAST  10/11/2023    CT ABDOMEN PELVIS ANGIOGRAM W AND/OR WO IV CONTRAST 10/11/2023 [id number] CT   • OTHER SURGICAL HISTORY  11/13/2018    Kidney surgery   • OTHER SURGICAL HISTORY  11/13/2018    Rotator cuff repair   • OTHER SURGICAL HISTORY  11/13/2018    Nephrectomy   • OTHER SURGICAL HISTORY  2012    Colonoscopy 2023 polyp      Social History     Socioeconomic History   • Marital status:      Spouse name: Not on file   • Number of children: Not on file   • Years of education: Not on file   • Highest education level: Not on file   Occupational History   • Not on file   Tobacco Use   • Smoking status: Never   • Smokeless tobacco: Never   Substance and Sexual Activity   • Alcohol use: Yes     Comment: social   • Drug use: Never   • Sexual activity: Not on file   Other Topics Concern   • Not on file   Social History Narrative   • Not on file     Social Determinants of Health     Financial Resource Strain: Not on file   Food Insecurity: Not on file   Transportation Needs: Not on file   Physical Activity: Not on file   Stress: Not on file   Social Connections: Not on file   Intimate Partner Violence: Not on file   Housing Stability: Not on file      Family History   Problem Relation Name Age of Onset   • No Known Problems Mother          age 99   • Alcohol abuse Father     • Stroke Father  74   • Cardiomyopathy Sister     • Alcohol abuse Sister     • Cardiomyopathy Brother        Patient Health Questionnaire-2 Score: 0          Review of Systems  All other system have been reviewed and are negative for complaint.  Objective  Neurological Exam  General Appearance:  No distress, alert, interactive and cooperative.     Cardiovascular: Regular, rate and rhythm, no murmurs,  normal S1 and S2. Carotid pulses intact without any bruits. Pulses +2 and equal in all extremities. No swelling, varicosities, edema, or tenderness to palpation.      Mental State: Orientation was normal to time, place and person. Recent and remote memory was intact.  Attention span and concentration were normal. Language testing was normal for comprehension, repetition, expression, and naming. The patient could correctly interpret a picture. General fund of knowledge was intact.     Ophthalmoscopic: The ophthalmoscopic exam normal. The fundi were well visualized with normal disc margins, clear vessels and vascular pulsations. No disc edema. The cup/disk ratio was not enlarged. No hemorrhages or exudates were present in the posterior segments that were visualized.     Cranial Nerves:   CN: Visual fields full to confrontation.   CN 3, 4, 6: Pupils round, 4 mm in diameter, equally reactive to light. Lids symmetric; no ptosis. EOMs normal alignment, full range with normal saccades, pursuit and convergence.   No nystagmus.   CN 5: Facial sensation intact bilaterally.   CN 7: Normal and symmetric facial strength. Nasolabial folds symmetric.   CN 8: Hearing intact to voice  CN 9: Palate elevates symmetrically.   CN 11: Normal strength of shoulder shrug and neck turning.   CN 12: Tongue midline, with normal bulk and strength; no fasciculations.     Motor: Muscle bulk and tone were normal in both upper and lower extremities. No fasciculations, tremor or other abnormal movements were present. Muscle strength was 5/5 in distal and proximal muscles in both upper and lower extremities.     Action tremor involving R hand >L hand and head (+lower jaw). Normal tone, no bradykinesia, no rest tremor.    Reflexes: Right/ Left:  Biceps 2/2, brachioradialis 2/2, triceps 2/2, patellar 2/2, ankle 2/2  Babinski: toes downgoing to plantar stimulation. No clonus, frontal release signs or other pathologic reflexes present.     Sensory: In  both upper and lower extremities, sensation was intact to light touch    Coordination: In both upper extremities, finger-nose-finger was intact without dysmetria or overshoot (action tremor present R>L). In both lower extremities, heel-to-shin was intact. JENNIE were intact in both upper and lower extremities.      Gait: Station was stable with a normal base. Gait was stable with a normal arm swing and speed. No ataxia, shuffling, steppage or waddling was present. No circumduction was present. Tandem gait was intact. No Romberg sign was present.        Movement specific examination:  Normal tone, no bradykinesia. No resting tremor. Gait is brisk with normal arm swing.   High frequency low amplitude postural and kinetic tremor.   Archimedes spirals mildly tremulous.                 Hemoglobin A1C   Date Value Ref Range Status   03/26/2024 5.3 see below % Final     Estimated Average Glucose   Date Value Ref Range Status   03/26/2024 105 Not Established mg/dL Final     Thyroid Stimulating Hormone   Date Value Ref Range Status   03/26/2024 1.29 0.44 - 3.98 mIU/L Final         Assessment/Plan    Xochilt Brock is a 73 y.o. year old female with hx on HTN (on Coreg), here to establish care for follow up of ET. Diagnosed around 2018 with R hand tremor and in past 2 years noted involvement of head. No red flag on exam and pt was reassured that no evidence of emerging parkinsonism. Agreeable to hold off medication for now as it does not interfere with daily activities. Patient to reach out to our office if she would like to start medication. Follow up in 1 year.  She was advised to get information from [employer] Essential tremor foundation as a great online resource.       Treatment's for essential tremor include several pharmacological agents. The American Academy of Neurology guidelines for essential tremor, gives Level A evidence to the use of Propranolol and primidone. There is level B evidence for gabapentin, topamax and  benzodiazepines. (Emily [employer] 2005, with update in 2011).     She is currently on coreg, so did not start propranolol prn.     I saw and evaluated the patient. I personally obtained the key and critical portions of the history and physical exam or was physically present for key and critical portions performed by the resident/fellow. I reviewed the resident/fellow's documentation and discussed the patient with the resident/fellow. I agree with the resident/fellow's medical decision making as documented in the note.    Hilaria Estevez MD       For the Evaluation and Management of this patient, the level of Medical Decision Making for this visit was determined based on the following:    The level of COMPLEXITY AND NUMBER OF PROBLEMS ADDRESSED was [MODERATE,as determined by:   .  MODERATE:  undiagnosed new problem with uncertain prognosis.      The AMOUNT/COMPLEXITY OF DATA TO REVIEW (reviewed, ordered or call for) was [LIMITED] as determined by:    LIMITED:  my review of prior external notes from a unique source.      The level of RISK OF COMPLICATIONS was [ LOW] as determined by:    LOW:  A low risk of morbidity from additional diagnostic testing or treatment.      Thus, the level of medical decision making (based on the lower of the two highest elements) was determined to be LOW]. Therefore the appropriate E/M code for this encounter is [31061

## 2024-07-01 NOTE — PATIENT INSTRUCTIONS
Pleasure meeting you today Ms. Brock. As we discussed, the head tremor you have noticed in the past 2 years is a slight progression of your essential tremor. Given that it does not interfere with any of your daily activities, we agree that we can hold off treatment for now. If the symptoms were to become more bothersome, please feel free to reach out to our office to discuss initiation of a medication. As we discussed, we will avoid using propranolol given that you are already on beta blocker called carvedilol.     For additional resources and information on essential tremor please visit the website [email or web address].org    Please come back to see us in 1 year for a follow up.    We wish you well!

## 2024-09-11 ENCOUNTER — APPOINTMENT (OUTPATIENT)
Dept: PRIMARY CARE | Facility: CLINIC | Age: 74
End: 2024-09-11
Payer: MEDICARE

## 2024-09-25 ENCOUNTER — PATIENT OUTREACH (OUTPATIENT)
Dept: PRIMARY CARE | Facility: CLINIC | Age: 74
End: 2024-09-25
Payer: MEDICARE

## 2024-09-25 DIAGNOSIS — I10 PRIMARY HYPERTENSION: ICD-10-CM

## 2024-09-25 NOTE — PROGRESS NOTES
Introduced patient to Chronic Care Management Program.    Explained that in my role as Care Manager I would:  -Be a point of contact with questions and concerns  -Focus on chronic conditions and achieving health goals  -Ensure patient is receiving all preventative care he is due for    Patient declined services at this time.  My contact info was provided for any needs that may arise.

## 2024-10-28 ENCOUNTER — LAB (OUTPATIENT)
Dept: LAB | Facility: LAB | Age: 74
End: 2024-10-28
Payer: MEDICARE

## 2024-10-28 ENCOUNTER — APPOINTMENT (OUTPATIENT)
Dept: PRIMARY CARE | Facility: CLINIC | Age: 74
End: 2024-10-28
Payer: MEDICARE

## 2024-10-28 ENCOUNTER — TELEPHONE (OUTPATIENT)
Dept: PRIMARY CARE | Facility: CLINIC | Age: 74
End: 2024-10-28

## 2024-10-28 ENCOUNTER — HOSPITAL ENCOUNTER (OUTPATIENT)
Dept: RADIOLOGY | Facility: CLINIC | Age: 74
Discharge: HOME | End: 2024-10-28
Payer: MEDICARE

## 2024-10-28 VITALS
BODY MASS INDEX: 23.34 KG/M2 | DIASTOLIC BLOOD PRESSURE: 80 MMHG | HEIGHT: 68 IN | HEART RATE: 70 BPM | WEIGHT: 154 LBS | SYSTOLIC BLOOD PRESSURE: 126 MMHG

## 2024-10-28 DIAGNOSIS — N18.30 STAGE 3 CHRONIC KIDNEY DISEASE, UNSPECIFIED WHETHER STAGE 3A OR 3B CKD (MULTI): ICD-10-CM

## 2024-10-28 DIAGNOSIS — M54.2 NECK PAIN: ICD-10-CM

## 2024-10-28 DIAGNOSIS — Z12.31 SCREENING MAMMOGRAM FOR BREAST CANCER: ICD-10-CM

## 2024-10-28 DIAGNOSIS — E55.9 VITAMIN D DEFICIENCY: ICD-10-CM

## 2024-10-28 DIAGNOSIS — R53.83 OTHER FATIGUE: ICD-10-CM

## 2024-10-28 DIAGNOSIS — I42.2 HYPERTROPHIC CARDIOMYOPATHY (MULTI): ICD-10-CM

## 2024-10-28 DIAGNOSIS — R73.9 ELEVATED BLOOD SUGAR: ICD-10-CM

## 2024-10-28 DIAGNOSIS — E78.2 MIXED HYPERLIPIDEMIA: ICD-10-CM

## 2024-10-28 DIAGNOSIS — G25.0 ESSENTIAL TREMOR: ICD-10-CM

## 2024-10-28 DIAGNOSIS — I72.8 SPLENIC ARTERY ANEURYSM (CMS-HCC): ICD-10-CM

## 2024-10-28 DIAGNOSIS — I10 PRIMARY HYPERTENSION: ICD-10-CM

## 2024-10-28 DIAGNOSIS — S06.5XAA SUBDURAL HEMATOMA (MULTI): ICD-10-CM

## 2024-10-28 DIAGNOSIS — Z00.00 WELL ADULT EXAM: Primary | ICD-10-CM

## 2024-10-28 PROBLEM — R41.3 IMPAIRED MEMORY: Status: RESOLVED | Noted: 2023-02-05 | Resolved: 2024-10-28

## 2024-10-28 PROBLEM — J32.9 SINUSITIS: Status: RESOLVED | Noted: 2023-02-05 | Resolved: 2024-10-28

## 2024-10-28 PROBLEM — R51.9 SCALP PAIN: Status: RESOLVED | Noted: 2023-02-05 | Resolved: 2024-10-28

## 2024-10-28 PROBLEM — R19.8 GASTROINTESTINAL SYMPTOM: Status: RESOLVED | Noted: 2023-02-05 | Resolved: 2024-10-28

## 2024-10-28 PROBLEM — U07.1 COVID-19: Status: RESOLVED | Noted: 2023-02-05 | Resolved: 2024-10-28

## 2024-10-28 PROBLEM — S76.319A HAMSTRING STRAIN: Status: RESOLVED | Noted: 2023-02-05 | Resolved: 2024-10-28

## 2024-10-28 LAB
25(OH)D3 SERPL-MCNC: 49 NG/ML (ref 30–100)
ALBUMIN SERPL BCP-MCNC: 4.5 G/DL (ref 3.4–5)
ALP SERPL-CCNC: 57 U/L (ref 33–136)
ALT SERPL W P-5'-P-CCNC: 19 U/L (ref 7–45)
ANION GAP SERPL CALC-SCNC: 11 MMOL/L (ref 10–20)
APPEARANCE UR: CLEAR
AST SERPL W P-5'-P-CCNC: 21 U/L (ref 9–39)
BASOPHILS # BLD AUTO: 0.1 X10*3/UL (ref 0–0.1)
BASOPHILS NFR BLD AUTO: 1.8 %
BILIRUB SERPL-MCNC: 1.2 MG/DL (ref 0–1.2)
BILIRUB UR STRIP.AUTO-MCNC: NEGATIVE MG/DL
BUN SERPL-MCNC: 21 MG/DL (ref 6–23)
CALCIUM SERPL-MCNC: 10 MG/DL (ref 8.6–10.3)
CHLORIDE SERPL-SCNC: 103 MMOL/L (ref 98–107)
CHOLEST SERPL-MCNC: 172 MG/DL (ref 0–199)
CHOLESTEROL/HDL RATIO: 3.2
CO2 SERPL-SCNC: 30 MMOL/L (ref 21–32)
COLOR UR: ABNORMAL
CREAT SERPL-MCNC: 0.94 MG/DL (ref 0.5–1.05)
EGFRCR SERPLBLD CKD-EPI 2021: 65 ML/MIN/1.73M*2
EOSINOPHIL # BLD AUTO: 0.19 X10*3/UL (ref 0–0.4)
EOSINOPHIL NFR BLD AUTO: 3.5 %
ERYTHROCYTE [DISTWIDTH] IN BLOOD BY AUTOMATED COUNT: 12.4 % (ref 11.5–14.5)
GLUCOSE SERPL-MCNC: 114 MG/DL (ref 74–99)
GLUCOSE UR STRIP.AUTO-MCNC: NORMAL MG/DL
HCT VFR BLD AUTO: 43 % (ref 36–46)
HDLC SERPL-MCNC: 53.1 MG/DL
HGB BLD-MCNC: 14.4 G/DL (ref 12–16)
IMM GRANULOCYTES # BLD AUTO: 0.01 X10*3/UL (ref 0–0.5)
IMM GRANULOCYTES NFR BLD AUTO: 0.2 % (ref 0–0.9)
KETONES UR STRIP.AUTO-MCNC: NEGATIVE MG/DL
LDLC SERPL CALC-MCNC: 97 MG/DL
LEUKOCYTE ESTERASE UR QL STRIP.AUTO: NEGATIVE
LYMPHOCYTES # BLD AUTO: 1.41 X10*3/UL (ref 0.8–3)
LYMPHOCYTES NFR BLD AUTO: 25.7 %
MCH RBC QN AUTO: 31.3 PG (ref 26–34)
MCHC RBC AUTO-ENTMCNC: 33.5 G/DL (ref 32–36)
MCV RBC AUTO: 94 FL (ref 80–100)
MONOCYTES # BLD AUTO: 0.56 X10*3/UL (ref 0.05–0.8)
MONOCYTES NFR BLD AUTO: 10.2 %
MUCOUS THREADS #/AREA URNS AUTO: NORMAL /LPF
NEUTROPHILS # BLD AUTO: 3.22 X10*3/UL (ref 1.6–5.5)
NEUTROPHILS NFR BLD AUTO: 58.6 %
NITRITE UR QL STRIP.AUTO: NEGATIVE
NON HDL CHOLESTEROL: 119 MG/DL (ref 0–149)
NRBC BLD-RTO: 0 /100 WBCS (ref 0–0)
PH UR STRIP.AUTO: 5 [PH]
PLATELET # BLD AUTO: 250 X10*3/UL (ref 150–450)
POTASSIUM SERPL-SCNC: 4.9 MMOL/L (ref 3.5–5.3)
PROT SERPL-MCNC: 7.2 G/DL (ref 6.4–8.2)
PROT UR STRIP.AUTO-MCNC: NEGATIVE MG/DL
RBC # BLD AUTO: 4.6 X10*6/UL (ref 4–5.2)
RBC # UR STRIP.AUTO: ABNORMAL /UL
RBC #/AREA URNS AUTO: NORMAL /HPF
SODIUM SERPL-SCNC: 139 MMOL/L (ref 136–145)
SP GR UR STRIP.AUTO: 1.01
TRIGL SERPL-MCNC: 112 MG/DL (ref 0–149)
TSH SERPL-ACNC: 1.36 MIU/L (ref 0.44–3.98)
UROBILINOGEN UR STRIP.AUTO-MCNC: NORMAL MG/DL
VIT B12 SERPL-MCNC: 699 PG/ML (ref 211–911)
VLDL: 22 MG/DL (ref 0–40)
WBC # BLD AUTO: 5.5 X10*3/UL (ref 4.4–11.3)
WBC #/AREA URNS AUTO: NORMAL /HPF

## 2024-10-28 PROCEDURE — 72040 X-RAY EXAM NECK SPINE 2-3 VW: CPT | Performed by: RADIOLOGY

## 2024-10-28 PROCEDURE — 83036 HEMOGLOBIN GLYCOSYLATED A1C: CPT

## 2024-10-28 PROCEDURE — 1160F RVW MEDS BY RX/DR IN RCRD: CPT | Performed by: INTERNAL MEDICINE

## 2024-10-28 PROCEDURE — 72040 X-RAY EXAM NECK SPINE 2-3 VW: CPT

## 2024-10-28 PROCEDURE — 1159F MED LIST DOCD IN RCRD: CPT | Performed by: INTERNAL MEDICINE

## 2024-10-28 PROCEDURE — G0446 INTENS BEHAVE THER CARDIO DX: HCPCS | Performed by: INTERNAL MEDICINE

## 2024-10-28 PROCEDURE — 3079F DIAST BP 80-89 MM HG: CPT | Performed by: INTERNAL MEDICINE

## 2024-10-28 PROCEDURE — 36415 COLL VENOUS BLD VENIPUNCTURE: CPT

## 2024-10-28 PROCEDURE — 99214 OFFICE O/P EST MOD 30 MIN: CPT | Performed by: INTERNAL MEDICINE

## 2024-10-28 PROCEDURE — G0439 PPPS, SUBSEQ VISIT: HCPCS | Performed by: INTERNAL MEDICINE

## 2024-10-28 PROCEDURE — 85025 COMPLETE CBC W/AUTO DIFF WBC: CPT

## 2024-10-28 PROCEDURE — 81001 URINALYSIS AUTO W/SCOPE: CPT

## 2024-10-28 PROCEDURE — 80061 LIPID PANEL: CPT

## 2024-10-28 PROCEDURE — 1036F TOBACCO NON-USER: CPT | Performed by: INTERNAL MEDICINE

## 2024-10-28 PROCEDURE — 84443 ASSAY THYROID STIM HORMONE: CPT

## 2024-10-28 PROCEDURE — 1123F ACP DISCUSS/DSCN MKR DOCD: CPT | Performed by: INTERNAL MEDICINE

## 2024-10-28 PROCEDURE — 3074F SYST BP LT 130 MM HG: CPT | Performed by: INTERNAL MEDICINE

## 2024-10-28 PROCEDURE — 93000 ELECTROCARDIOGRAM COMPLETE: CPT | Performed by: INTERNAL MEDICINE

## 2024-10-28 PROCEDURE — 82607 VITAMIN B-12: CPT

## 2024-10-28 PROCEDURE — 3008F BODY MASS INDEX DOCD: CPT | Performed by: INTERNAL MEDICINE

## 2024-10-28 PROCEDURE — 80053 COMPREHEN METABOLIC PANEL: CPT

## 2024-10-28 PROCEDURE — 82306 VITAMIN D 25 HYDROXY: CPT

## 2024-10-28 SDOH — ECONOMIC STABILITY: FOOD INSECURITY: WITHIN THE PAST 12 MONTHS, YOU WORRIED THAT YOUR FOOD WOULD RUN OUT BEFORE YOU GOT MONEY TO BUY MORE.: NEVER TRUE

## 2024-10-28 SDOH — ECONOMIC STABILITY: INCOME INSECURITY: IN THE LAST 12 MONTHS, WAS THERE A TIME WHEN YOU WERE NOT ABLE TO PAY THE MORTGAGE OR RENT ON TIME?: NO

## 2024-10-28 SDOH — ECONOMIC STABILITY: TRANSPORTATION INSECURITY
IN THE PAST 12 MONTHS, HAS THE LACK OF TRANSPORTATION KEPT YOU FROM MEDICAL APPOINTMENTS OR FROM GETTING MEDICATIONS?: NO

## 2024-10-28 SDOH — ECONOMIC STABILITY: TRANSPORTATION INSECURITY
IN THE PAST 12 MONTHS, HAS LACK OF TRANSPORTATION KEPT YOU FROM MEETINGS, WORK, OR FROM GETTING THINGS NEEDED FOR DAILY LIVING?: NO

## 2024-10-28 SDOH — ECONOMIC STABILITY: FOOD INSECURITY: WITHIN THE PAST 12 MONTHS, THE FOOD YOU BOUGHT JUST DIDN'T LAST AND YOU DIDN'T HAVE MONEY TO GET MORE.: NEVER TRUE

## 2024-10-28 ASSESSMENT — SOCIAL DETERMINANTS OF HEALTH (SDOH): IN THE PAST 12 MONTHS, HAS THE ELECTRIC, GAS, OIL, OR WATER COMPANY THREATENED TO SHUT OFF SERVICE IN YOUR HOME?: NO

## 2024-10-28 ASSESSMENT — PATIENT HEALTH QUESTIONNAIRE - PHQ9
1. LITTLE INTEREST OR PLEASURE IN DOING THINGS: NOT AT ALL
SUM OF ALL RESPONSES TO PHQ9 QUESTIONS 1 AND 2: 0
2. FEELING DOWN, DEPRESSED OR HOPELESS: NOT AT ALL

## 2024-10-29 LAB
EST. AVERAGE GLUCOSE BLD GHB EST-MCNC: 111 MG/DL
HBA1C MFR BLD: 5.5 %

## 2024-10-31 ENCOUNTER — TELEPHONE (OUTPATIENT)
Dept: PRIMARY CARE | Facility: CLINIC | Age: 74
End: 2024-10-31
Payer: MEDICARE

## 2024-10-31 DIAGNOSIS — R31.9 HEMATURIA, UNSPECIFIED TYPE: ICD-10-CM

## 2024-11-08 ENCOUNTER — APPOINTMENT (OUTPATIENT)
Dept: PRIMARY CARE | Facility: CLINIC | Age: 74
End: 2024-11-08
Payer: MEDICARE

## 2024-11-26 ENCOUNTER — EVALUATION (OUTPATIENT)
Dept: PHYSICAL THERAPY | Facility: CLINIC | Age: 74
End: 2024-11-26
Payer: MEDICARE

## 2024-11-26 DIAGNOSIS — E55.9 VITAMIN D DEFICIENCY: ICD-10-CM

## 2024-11-26 DIAGNOSIS — R53.83 OTHER FATIGUE: ICD-10-CM

## 2024-11-26 DIAGNOSIS — Z12.31 SCREENING MAMMOGRAM FOR BREAST CANCER: ICD-10-CM

## 2024-11-26 DIAGNOSIS — R73.9 ELEVATED BLOOD SUGAR: ICD-10-CM

## 2024-11-26 DIAGNOSIS — I42.2 HYPERTROPHIC CARDIOMYOPATHY (MULTI): ICD-10-CM

## 2024-11-26 DIAGNOSIS — N18.30 STAGE 3 CHRONIC KIDNEY DISEASE, UNSPECIFIED WHETHER STAGE 3A OR 3B CKD (MULTI): ICD-10-CM

## 2024-11-26 DIAGNOSIS — M54.2 NECK PAIN: ICD-10-CM

## 2024-11-26 DIAGNOSIS — S06.5XAA SUBDURAL HEMATOMA (MULTI): ICD-10-CM

## 2024-11-26 DIAGNOSIS — E78.2 MIXED HYPERLIPIDEMIA: ICD-10-CM

## 2024-11-26 PROCEDURE — 97161 PT EVAL LOW COMPLEX 20 MIN: CPT | Mod: GP

## 2024-11-26 PROCEDURE — 97110 THERAPEUTIC EXERCISES: CPT | Mod: GP

## 2024-11-26 PROCEDURE — 97140 MANUAL THERAPY 1/> REGIONS: CPT | Mod: GP

## 2024-11-26 NOTE — PROGRESS NOTES
"Physical Therapy     Physical Therapy Evaluation        Patient Name: Xochilt Brock  MRN: [id number]  Today's Date: 11/26/2024  Time Calculation  Start Time: 0951  Stop Time: 1033  Time Calculation (min): 42 min    Reason for Visit  Referred by: Dr. Santos  Referral DX: neck pain    Insurance:  Visit: #1  [place]ized[place]  Certification: 11-26-24 to 1-17-25  Payor: MEDICARE / Plan: MEDICARE PART A AND B / Product Type: *No Product type* /     Diagnosis:  1. Stage 3 chronic kidney disease, unspecified whether stage 3a or 3b CKD (Multi)    2. Elevated blood sugar    3. Other fatigue    4. Hypertrophic cardiomyopathy (Multi)    5. Mixed hyperlipidemia    6. Screening mammogram for breast cancer    7. Neck pain    8. Subdural hematoma (Multi)    9. Vitamin D deficiency          Subjective:  Date of Onset: 10-1-24  Chief Complaint: neck pain/stiffness  SAMIA: gradual onset  HX: \"I started waking up with neck stiffness and pain around the beginning of October. I can't figure out what is causing it. I've tried Tylenol and getting a different pillow. I've also tried heat and ice.     Prior level of function: no prior functional limitation   Functional limitations: looking down to read, turning head while driving   Home environment: stairs  Work status/occupation: retired  Recreational activity: walking, gardening    Patient stated goals for treatment: reduce pain     Reviewed medical screening history form with patient: completed      Precautions: no fall risk         Pain:  Location: right side lower neck   Nature: ache   Current pain: 0/10; Range: 0-8/10  Aggravating factor: looking down, turning head   Alleviating factor: rest    Objective:  Observation/Posture: rounded shoulders     AROM: cervical: ext: 38, flex: 28*, Rot: R 38, L 40       PROM/mobility:     Palpation: TTP R UT, TTP right suboccipital    MMT: DNF endurance: 10 sec      Outcome Measure:   Other Measures  Neck Disability Index: 16%       Treatment:  Educated " pt on course of therapy  Sup OA nod x 10  Sup suboccipital release x 5 min  Sup R UT STM x 5 min  Sup chin nod x 10   Sup scap set x 10    OP Education: HEP: #5-6    Charges: Eval, Manual x 1    Assessment:   Pt presents with dx neck pain. Demonstrates demonstrates limited cervical ROM with cervical muscle guarded noted on right side UT and suboccipitals. Pt will benefit from therapy to address deficits.     Plan:  Frequency/duration: 1x/wk for 6 weeks  Planned interventions: Therapeutic exercise (ROM, stretching, strengthening), manual therapy, balance/gait training, education and activity modification  Rehab Potential to achieve goals: good     Goals:  Improve NDI > 6 by 6 weeks  Improve AROM cerival rotation B to 50 degrees to allow driving without pain by 6 weeks  Improve MMT DNF endurance to 20 sec to improve tx out of bed by 6 weeks  Read without sx by 6 weeks    Nima Billingsley, PT

## 2024-11-29 ENCOUNTER — HOSPITAL ENCOUNTER (OUTPATIENT)
Dept: RADIOLOGY | Facility: HOSPITAL | Age: 74
Discharge: HOME | End: 2024-11-29
Payer: MEDICARE

## 2024-11-29 ENCOUNTER — LAB (OUTPATIENT)
Dept: LAB | Facility: LAB | Age: 74
End: 2024-11-29
Payer: MEDICARE

## 2024-11-29 VITALS — BODY MASS INDEX: 22.88 KG/M2 | WEIGHT: 151 LBS | HEIGHT: 68 IN

## 2024-11-29 DIAGNOSIS — Z12.31 SCREENING MAMMOGRAM FOR BREAST CANCER: ICD-10-CM

## 2024-11-29 DIAGNOSIS — R31.9 HEMATURIA, UNSPECIFIED TYPE: ICD-10-CM

## 2024-11-29 LAB
APPEARANCE UR: CLEAR
BILIRUB UR STRIP.AUTO-MCNC: NEGATIVE MG/DL
COLOR UR: YELLOW
GLUCOSE UR STRIP.AUTO-MCNC: NORMAL MG/DL
HYALINE CASTS #/AREA URNS AUTO: ABNORMAL /LPF
KETONES UR STRIP.AUTO-MCNC: NEGATIVE MG/DL
LEUKOCYTE ESTERASE UR QL STRIP.AUTO: NEGATIVE
MUCOUS THREADS #/AREA URNS AUTO: ABNORMAL /LPF
NITRITE UR QL STRIP.AUTO: NEGATIVE
PH UR STRIP.AUTO: 5.5 [PH]
PROT UR STRIP.AUTO-MCNC: NORMAL MG/DL
RBC # UR STRIP.AUTO: NEGATIVE /UL
RBC #/AREA URNS AUTO: ABNORMAL /HPF
SP GR UR STRIP.AUTO: 1.02
UROBILINOGEN UR STRIP.AUTO-MCNC: NORMAL MG/DL
WBC #/AREA URNS AUTO: ABNORMAL /HPF

## 2024-11-29 PROCEDURE — 81001 URINALYSIS AUTO W/SCOPE: CPT

## 2024-11-29 PROCEDURE — 87086 URINE CULTURE/COLONY COUNT: CPT

## 2024-11-29 PROCEDURE — 77067 SCR MAMMO BI INCL CAD: CPT

## 2024-12-01 LAB — BACTERIA UR CULT: NO GROWTH

## 2024-12-03 ENCOUNTER — TREATMENT (OUTPATIENT)
Dept: PHYSICAL THERAPY | Facility: CLINIC | Age: 74
End: 2024-12-03
Payer: MEDICARE

## 2024-12-03 DIAGNOSIS — R80.9 PROTEINURIA, UNSPECIFIED TYPE: ICD-10-CM

## 2024-12-03 DIAGNOSIS — M54.2 NECK PAIN: Primary | ICD-10-CM

## 2024-12-03 PROCEDURE — 97140 MANUAL THERAPY 1/> REGIONS: CPT | Mod: GP

## 2024-12-03 PROCEDURE — 97110 THERAPEUTIC EXERCISES: CPT | Mod: GP

## 2024-12-03 NOTE — PROGRESS NOTES
"Physical Therapy    Physical Therapy Treatment    Patient Name: Xochilt Brock  MRN: [id number]  Today's Date: 12/3/2024  Time Calculation  Start Time: 1033  Stop Time: 1118  Time Calculation (min): 45 min        Insurance:  Visit: #2  [place]  Certification: 11-26-24 to 1-17-25  Payor: MEDICARE / Plan: MEDICARE PART A AND B / Product Type: *No Product type* /     Subjective:  \"I'm feeling about the same. I notice feeling better after doing my stretches.\"     Current pain: 3/10; Range: 0-6/10    Objective:  AROM: cervical: flex: 35    Treatment:  Sup sub occipital release x 5 min  Sup STM R UT, cervical paraspinals x 10 min  Sup cervical side glide Gr III C4-6 x 10  Sup chin nod x 10   Sup scap set x 10   Seated UT stretch with self soft tissue mobilization x 10 sec x 5  Seated right cervical rotation C4 MWM  x 5     OP Education: HEP: #6    Charges: Manual x 1, TE x         Diagnosis:  1. Neck pain          Assessment:   Pt demonstrates good tolerance to manual therapy and ROM exercise with improved cervical ROM after tx.     Plan:  Continue with mobility exercise and manual therapy.    Nima Billingsley, PT  "

## 2024-12-10 ENCOUNTER — TREATMENT (OUTPATIENT)
Dept: PHYSICAL THERAPY | Facility: CLINIC | Age: 74
End: 2024-12-10
Payer: MEDICARE

## 2024-12-10 DIAGNOSIS — M54.2 NECK PAIN: Primary | ICD-10-CM

## 2024-12-10 PROCEDURE — 97140 MANUAL THERAPY 1/> REGIONS: CPT | Mod: GP

## 2024-12-10 PROCEDURE — 97110 THERAPEUTIC EXERCISES: CPT | Mod: GP

## 2024-12-10 NOTE — PROGRESS NOTES
"Physical Therapy    Physical Therapy Treatment    Patient Name: Xochilt Brock  MRN: [id number]  Today's Date: 12/10/2024  Time Calculation  Start Time: 1033  Stop Time: 1117  Time Calculation (min): 44 min        Insurance:  Visit: #3  [place]  Certification: 11-26-24 to 1-17-25  Payor: MEDICARE / Plan: MEDICARE PART A AND B / Product Type: *No Product type* /     Subjective:  \"I was sore after the last visit. I do feel much better than before starting therapy. The seem to help.\"     Current pain: 0/10; Range: 0-8/10    Objective:  AROM: cervical: flex: 37    Treatment:  Sup sub occipital release x 5 min  Sup STM R UT, cervical paraspinals x 10 min  Sup cervical side glide Gr III C4-6 x 10  Sup chin nod x 10   Sup scap set x 10   Seated UT stretch with self soft tissue mobilization x 10 sec x 5  Seated right cervical rotation C4 MWM  x 5     OP Education: HEP: reviewed    Charges: Manual x 1, TE x 2        Diagnosis:  1. Neck pain            Assessment:   Pt demonstrates good tolerance to manual therapy and ROM exercise with improved cervical ROM after tx.     Plan:  Continue with mobility exercise and manual therapy.    Nima Billingsley, PT  "

## 2024-12-16 ENCOUNTER — TREATMENT (OUTPATIENT)
Dept: PHYSICAL THERAPY | Facility: CLINIC | Age: 74
End: 2024-12-16
Payer: MEDICARE

## 2024-12-16 DIAGNOSIS — M54.2 NECK PAIN: ICD-10-CM

## 2024-12-16 PROCEDURE — 97110 THERAPEUTIC EXERCISES: CPT | Mod: GP

## 2024-12-16 PROCEDURE — 97140 MANUAL THERAPY 1/> REGIONS: CPT | Mod: GP

## 2024-12-16 NOTE — PROGRESS NOTES
"Physical Therapy    Physical Therapy Treatment    Patient Name: Xochilt Brock  MRN: [id number]  Today's Date: 12/16/2024  Time Calculation  Start Time: 1415  Stop Time: 1458  Time Calculation (min): 43 min        Insurance:  Visit: #4  [place]  Certification: 11-26-24 to 1-17-25  Payor: MEDICARE / Plan: MEDICARE PART A AND B / Product Type: *No Product type* /     Subjective:  \"Those exercises really help. I feel better in the morning when doing them. If I don't do them I can tell a difference.\"     Current pain: 0/10; Range: 0-4/10    Objective:  AROM: cervical: flex: 58; Rot: R 50, L 58    Treatment:  Sup sub occipital release x 5 min  Sup STM R UT, cervical paraspinals x 10 min  Sup cervical side glide Gr III C4-6 x 10  Sup chin nod x 10   Sup scap set x 10   Seated UT stretch with self soft tissue mobilization x 10 sec x 5  Seated right cervical rotation C4 MWM  x 5   Seated levator stretch x 20 sec x 4    OP Education: HEP: reviewed    Charges: Manual x 1, TE x 2        Diagnosis:  1. Neck pain            Assessment:   Pt demonstrates good tolerance to manual therapy and ROM exercise with improved cervical ROM after tx.     Plan:  Continue with mobility exercise and manual therapy; follow up in 2 weeks.     Nima Billingsley, PT  "

## 2024-12-18 ENCOUNTER — TELEMEDICINE (OUTPATIENT)
Dept: PRIMARY CARE | Facility: CLINIC | Age: 74
End: 2024-12-18
Payer: MEDICARE

## 2024-12-18 ENCOUNTER — TELEPHONE (OUTPATIENT)
Dept: PRIMARY CARE | Facility: CLINIC | Age: 74
End: 2024-12-18

## 2024-12-18 DIAGNOSIS — U07.1 COVID: Primary | ICD-10-CM

## 2024-12-18 PROCEDURE — 99212 OFFICE O/P EST SF 10 MIN: CPT | Performed by: INTERNAL MEDICINE

## 2024-12-18 PROCEDURE — 1123F ACP DISCUSS/DSCN MKR DOCD: CPT | Performed by: INTERNAL MEDICINE

## 2024-12-19 NOTE — PROGRESS NOTES
Pt called   Fatigue cough, no sob  Sx started today   Pos covid  Requesting medication  Paxlovid sent  I also spoke w pharmacist re renal dosing  Stop lipitor   Discussed treatment options available for COVID infection, reviewed risks and benefits of the medication prescribed, and all questions were answered. This medication must be used with caution in those with hepatic disease and can cause hepatotoxicity, and with kidney disease (or a glomerular filtration rate <30 mL/min),   Your Statin medication should be stopped 12 hours prior to beginning Paxlovid and discontinue for 5 days after therapy is completed.       Pt is aware of this

## 2024-12-28 ENCOUNTER — PATIENT MESSAGE (OUTPATIENT)
Dept: INFUSION THERAPY | Facility: CLINIC | Age: 74
End: 2024-12-28
Payer: MEDICARE

## 2024-12-28 DIAGNOSIS — M85.80 OSTEOPENIA, UNSPECIFIED LOCATION: Primary | ICD-10-CM

## 2024-12-30 ENCOUNTER — APPOINTMENT (OUTPATIENT)
Dept: PHYSICAL THERAPY | Facility: CLINIC | Age: 74
End: 2024-12-30
Payer: MEDICARE

## 2024-12-30 DIAGNOSIS — E55.9 VITAMIN D DEFICIENCY: ICD-10-CM

## 2024-12-31 NOTE — PROGRESS NOTES
"Problem List Items Addressed This Visit    None  Visit Diagnoses       Acute sinusitis, recurrence not specified, unspecified location    -  Primary    sx onset 12/17  home covid 12/18  rapid strep neg  augmentin now to cover upper/lower resp  prn fu io    Relevant Medications    amoxicillin-pot clavulanate (Augmentin) 875-125 mg tablet    Other Relevant Orders    POCT Rapid Strep A manually resulted (Completed)             Subjective   Patient ID: Xochilt Brock is a 74 y.o. female who presents for Sick Visit (Poss sinus infection).  HPI  Home covid pos 12/18/24  Sx onset 12/17/24   Renal dosed paxlovid  Tolerated  Did start feeling better  Sinus sx are worsened  No fever  Cough is more frequent  No sob or wheeze  Sore throat  Ear pain resolved since Monday     GFR = 65  Cr = 0.94  58 mL/min  Creatinine clearance, original Cockcroft-Gault    Review of Systems   All other systems reviewed and are negative.      BP Readings from Last 3 Encounters:   01/02/25 147/87   10/28/24 126/80   07/01/24 152/82      Wt Readings from Last 3 Encounters:   01/02/25 68.5 kg (151 lb)   11/29/24 68.5 kg (151 lb)   10/28/24 69.9 kg (154 lb)      BMI:   Estimated body mass index is 22.96 kg/m² as calculated from the following:    Height as of this encounter: 1.727 m (5' 8\").    Weight as of this encounter: 68.5 kg (151 lb).    Objective   Physical Exam  "

## 2025-01-02 ENCOUNTER — OFFICE VISIT (OUTPATIENT)
Dept: PRIMARY CARE | Facility: CLINIC | Age: 75
End: 2025-01-02
Payer: MEDICARE

## 2025-01-02 ENCOUNTER — LAB (OUTPATIENT)
Dept: LAB | Facility: LAB | Age: 75
End: 2025-01-02
Payer: MEDICARE

## 2025-01-02 VITALS
SYSTOLIC BLOOD PRESSURE: 147 MMHG | HEART RATE: 74 BPM | DIASTOLIC BLOOD PRESSURE: 87 MMHG | BODY MASS INDEX: 22.88 KG/M2 | WEIGHT: 151 LBS | HEIGHT: 68 IN | OXYGEN SATURATION: 97 % | TEMPERATURE: 98.4 F

## 2025-01-02 DIAGNOSIS — M85.80 OSTEOPENIA, UNSPECIFIED LOCATION: ICD-10-CM

## 2025-01-02 DIAGNOSIS — M85.80 OSTEOPENIA, UNSPECIFIED LOCATION: Primary | ICD-10-CM

## 2025-01-02 DIAGNOSIS — E55.9 VITAMIN D DEFICIENCY: ICD-10-CM

## 2025-01-02 DIAGNOSIS — J01.90 ACUTE SINUSITIS, RECURRENCE NOT SPECIFIED, UNSPECIFIED LOCATION: Primary | ICD-10-CM

## 2025-01-02 DIAGNOSIS — R80.9 PROTEINURIA, UNSPECIFIED TYPE: ICD-10-CM

## 2025-01-02 LAB
25(OH)D3 SERPL-MCNC: 40 NG/ML (ref 30–100)
ALBUMIN SERPL BCP-MCNC: 4.4 G/DL (ref 3.4–5)
ALP SERPL-CCNC: 61 U/L (ref 33–136)
ALT SERPL W P-5'-P-CCNC: 35 U/L (ref 7–45)
ANION GAP SERPL CALC-SCNC: 11 MMOL/L (ref 10–20)
APPEARANCE UR: CLEAR
AST SERPL W P-5'-P-CCNC: 30 U/L (ref 9–39)
BILIRUB SERPL-MCNC: 0.7 MG/DL (ref 0–1.2)
BILIRUB UR STRIP.AUTO-MCNC: NEGATIVE MG/DL
BUN SERPL-MCNC: 18 MG/DL (ref 6–23)
CALCIUM SERPL-MCNC: 10.2 MG/DL (ref 8.6–10.3)
CHLORIDE SERPL-SCNC: 104 MMOL/L (ref 98–107)
CO2 SERPL-SCNC: 31 MMOL/L (ref 21–32)
COLOR UR: YELLOW
CREAT SERPL-MCNC: 0.98 MG/DL (ref 0.5–1.05)
EGFRCR SERPLBLD CKD-EPI 2021: 61 ML/MIN/1.73M*2
GLUCOSE SERPL-MCNC: 118 MG/DL (ref 74–99)
GLUCOSE UR STRIP.AUTO-MCNC: NORMAL MG/DL
KETONES UR STRIP.AUTO-MCNC: NEGATIVE MG/DL
LEUKOCYTE ESTERASE UR QL STRIP.AUTO: NEGATIVE
MUCOUS THREADS #/AREA URNS AUTO: NORMAL /LPF
NITRITE UR QL STRIP.AUTO: NEGATIVE
PH UR STRIP.AUTO: 5 [PH]
POC RAPID STREP: NEGATIVE
POTASSIUM SERPL-SCNC: 4.6 MMOL/L (ref 3.5–5.3)
PROT SERPL-MCNC: 7.3 G/DL (ref 6.4–8.2)
PROT UR STRIP.AUTO-MCNC: NEGATIVE MG/DL
RBC # UR STRIP.AUTO: ABNORMAL /UL
RBC #/AREA URNS AUTO: NORMAL /HPF
SODIUM SERPL-SCNC: 141 MMOL/L (ref 136–145)
SP GR UR STRIP.AUTO: 1.02
UROBILINOGEN UR STRIP.AUTO-MCNC: NORMAL MG/DL
WBC #/AREA URNS AUTO: NORMAL /HPF

## 2025-01-02 PROCEDURE — 3079F DIAST BP 80-89 MM HG: CPT | Performed by: NURSE PRACTITIONER

## 2025-01-02 PROCEDURE — 1159F MED LIST DOCD IN RCRD: CPT | Performed by: NURSE PRACTITIONER

## 2025-01-02 PROCEDURE — 87880 STREP A ASSAY W/OPTIC: CPT | Performed by: NURSE PRACTITIONER

## 2025-01-02 PROCEDURE — 80053 COMPREHEN METABOLIC PANEL: CPT

## 2025-01-02 PROCEDURE — 1160F RVW MEDS BY RX/DR IN RCRD: CPT | Performed by: NURSE PRACTITIONER

## 2025-01-02 PROCEDURE — 82306 VITAMIN D 25 HYDROXY: CPT

## 2025-01-02 PROCEDURE — 1036F TOBACCO NON-USER: CPT | Performed by: NURSE PRACTITIONER

## 2025-01-02 PROCEDURE — G2211 COMPLEX E/M VISIT ADD ON: HCPCS | Performed by: NURSE PRACTITIONER

## 2025-01-02 PROCEDURE — 99213 OFFICE O/P EST LOW 20 MIN: CPT | Performed by: NURSE PRACTITIONER

## 2025-01-02 PROCEDURE — 3008F BODY MASS INDEX DOCD: CPT | Performed by: NURSE PRACTITIONER

## 2025-01-02 PROCEDURE — 3077F SYST BP >= 140 MM HG: CPT | Performed by: NURSE PRACTITIONER

## 2025-01-02 PROCEDURE — 1123F ACP DISCUSS/DSCN MKR DOCD: CPT | Performed by: NURSE PRACTITIONER

## 2025-01-02 PROCEDURE — 81001 URINALYSIS AUTO W/SCOPE: CPT

## 2025-01-02 RX ORDER — AMOXICILLIN AND CLAVULANATE POTASSIUM 875; 125 MG/1; MG/1
875 TABLET, FILM COATED ORAL 2 TIMES DAILY
Qty: 14 TABLET | Refills: 0 | Status: SHIPPED | OUTPATIENT
Start: 2025-01-02 | End: 2025-01-09

## 2025-01-02 RX ORDER — ZOLEDRONIC ACID 5 MG/100ML
5 INJECTION, SOLUTION INTRAVENOUS ONCE
OUTPATIENT
Start: 2025-01-09

## 2025-01-03 DIAGNOSIS — E78.2 MIXED HYPERLIPIDEMIA: ICD-10-CM

## 2025-01-03 RX ORDER — ATORVASTATIN CALCIUM 10 MG/1
10 TABLET, FILM COATED ORAL DAILY
Qty: 90 TABLET | Refills: 3 | Status: SHIPPED | OUTPATIENT
Start: 2025-01-03

## 2025-01-09 ENCOUNTER — APPOINTMENT (OUTPATIENT)
Dept: INFUSION THERAPY | Facility: CLINIC | Age: 75
End: 2025-01-09
Payer: MEDICARE

## 2025-01-09 VITALS
HEART RATE: 66 BPM | OXYGEN SATURATION: 100 % | SYSTOLIC BLOOD PRESSURE: 162 MMHG | TEMPERATURE: 98.3 F | RESPIRATION RATE: 18 BRPM | DIASTOLIC BLOOD PRESSURE: 85 MMHG

## 2025-01-09 DIAGNOSIS — M85.80 OSTEOPENIA, UNSPECIFIED LOCATION: ICD-10-CM

## 2025-01-09 PROCEDURE — 96365 THER/PROPH/DIAG IV INF INIT: CPT | Performed by: REGISTERED NURSE

## 2025-01-09 RX ORDER — ALBUTEROL SULFATE 0.83 MG/ML
3 SOLUTION RESPIRATORY (INHALATION) AS NEEDED
Status: CANCELLED | OUTPATIENT
Start: 2025-01-09

## 2025-01-09 RX ORDER — DIPHENHYDRAMINE HYDROCHLORIDE 50 MG/ML
50 INJECTION INTRAMUSCULAR; INTRAVENOUS AS NEEDED
Status: CANCELLED | OUTPATIENT
Start: 2025-01-09

## 2025-01-09 RX ORDER — EPINEPHRINE 0.3 MG/.3ML
0.3 INJECTION SUBCUTANEOUS EVERY 5 MIN PRN
Status: CANCELLED | OUTPATIENT
Start: 2025-01-09

## 2025-01-09 RX ORDER — FAMOTIDINE 10 MG/ML
20 INJECTION INTRAVENOUS ONCE AS NEEDED
Status: CANCELLED | OUTPATIENT
Start: 2025-01-09

## 2025-01-09 RX ORDER — ZOLEDRONIC ACID 5 MG/100ML
5 INJECTION, SOLUTION INTRAVENOUS ONCE
Status: CANCELLED | OUTPATIENT
Start: 2025-01-09

## 2025-01-09 RX ORDER — ZOLEDRONIC ACID 5 MG/100ML
5 INJECTION, SOLUTION INTRAVENOUS ONCE
Status: COMPLETED | OUTPATIENT
Start: 2025-01-09 | End: 2025-01-09

## 2025-01-09 RX ADMIN — ZOLEDRONIC ACID 5 MG: 5 INJECTION, SOLUTION INTRAVENOUS at 10:15

## 2025-01-09 ASSESSMENT — ENCOUNTER SYMPTOMS
COUGH: 1
GASTROINTESTINAL NEGATIVE: 1
CONSTITUTIONAL NEGATIVE: 1
MUSCULOSKELETAL NEGATIVE: 1
NEUROLOGICAL NEGATIVE: 1
CARDIOVASCULAR NEGATIVE: 1

## 2025-01-09 NOTE — PROGRESS NOTES
[facility]   Infusion Clinic Note   Date: 2025   Name: Xcohilt Brock  : 1950   MRN: [id number]          Reason for Visit: OP Infusion (PATIENT HERE FOR RECLAST 5 MG INFUSION)         Today: We administered zoledronic acid.       Visit Type: INFUSION       Ordered By: Aida Santos MD        Accompanied by:Self       Diagnosis: Osteopenia, unspecified location        Allergies:   Allergies as of 2025 - Reviewed 2025   Allergen Reaction Noted    Codeine Nausea Only and Other 2023    Nsaids (non-steroidal anti-inflammatory drug) Other 10/28/2024          Current Medications has a current medication list which includes the following prescription(s): amoxicillin-pot clavulanate, atorvastatin, biotin, calcium carbonate, carvedilol, cholecalciferol, fluticasone, multivitamin, and zoledronic acid, and the following Facility-Administered Medications: zoledronic acid.       Vitals:   Vitals:    25 0954 25 1050   BP: 175/81 162/85   Pulse: 65 66   Resp: 17 18   Temp: 36.3 °C (97.3 °F) 36.8 °C (98.3 °F)   SpO2: 99% 100%             Infusion Pre-procedure Checklist:   - Allergies reviewed: yes   - Medications reviewed: yes       - Previous reaction to current treatment: no      Assess patient for the concerns below. Document provider notification as appropriate.  - Active or recent infection with/without current antibiotic use: yes FINISHED ABX YESTERDAY; OK TO INFUSE TODAY PER NP  - Recent or planned invasive dental work: no  - Recent or planned surgeries: no  - Recently received or plans to receive vaccinations: no  - Has treatment related toxicities: no  - Is pregnant:  n/a      Pain: 0   - Is the pain different from normal: n/a   - Is your Doctor aware:  n/a       Labs:  REVIEWED          Fall Risk Screening:         Review Of Systems:  Review of Systems   Constitutional: Negative.    Respiratory:  Positive for cough (RESIDUAL COUGH FROM  "COVID/SINUS INFECTION).    Cardiovascular: Negative.    Gastrointestinal: Negative.    Musculoskeletal: Negative.    Skin: Negative.    Neurological: Negative.          ROS completed? Yes      Infusion Readiness:  - Assessment Concerns Related to Infusion: No  - Provider notified: no      Document Below Only If Indicated:   New Patient Education:    N/A (returning patient for continuation of therapy. Ongoing education provided as needed.)        Treatment Conditions & Drug Specific Questions:    Zoledronic Acid  (RECLAST)    (Unless otherwise specified on patient specific therapy plan):     TREATMENT CONDITIONS:  Unless otherwise specified on patient specific therapy plan HOLD and notify provider prior to proceeding with treatment if:   o Creatinine clearance LESS THAN 35 mL/Minute  o Corrected serum calcium LESS THAN 8.6 mg/dL   OR   Ionized calcium LESS THAN 1.1 mmol  o Recent (within 4 weeks) or planned invasive dental procedure.  -           Positive Pregnancy     Lab Results   Component Value Date    CREATININE 0.98 01/02/2025      Lab Results   Component Value Date    CALCIUM 10.2 01/02/2025      No results found for: \"CAION\"    CrCl: 55.902  Corrected calcium: 9.88    Labs reviewed and patient meets treatment conditions? Yes    DRUG SPECIFIC QUESTIONS:  Is the patient taking a Calcium and Vitamin D supplement?  YES  (Recommended)    Is the patient receiving Zometa or do they have an allergy to Zometa?  No    Is the patient aware of the adverse effects which may include bone fractures, hypocalcemia, influenza-like illness, musculoskeletal pain, ocular inflammation, and osteonecrosis of the jaw? No      REMINDERS:  PREGNANCY CATEGORY X DRUG. OBTAIN NEGATIVE PREGNANCY TEST PRIOR TO FIRST INFUSION FOR WOMEN OF CHILDBEARING ABILITY     Recommended Vitals/Observation:  Monitor vital signs before infusion, at the end of the infusion and as needed        Weight Based Drug Calculations:    WEIGHT BASED DRUGS: NOT " APPLICABLE / FLAT DOSE          Initiated By: Rachel Rodriguez, RN

## 2025-01-09 NOTE — PATIENT INSTRUCTIONS
Today :We administered zoledronic acid.     For:   1. Osteopenia, unspecified location         Your next appointment is due in:  1 YEAR        Please read the  Medication Guide that was given to you and reviewed during todays visit.     (Tell all doctors including dentists that you are taking this medication)     Go to the emergency room or call 911 if:  -You have signs of allergic reaction:   -Rash, hives, itching.   -Swollen, blistered, peeling skin.   -Swelling of face, lips, mouth, tongue or throat.   -Tightness of chest, trouble breathing, swallowing or talking     Call your doctor:  - If IV / injection site gets red, warm, swollen, itchy or leaks fluid or pus.     (Leave dressing on your IV site for at least 2 hours and keep area clean and dry  - If you get sick or have symptoms of infection or are not feeling well for any reason.    (Wash your hands often, stay away from people who are sick)  - If you have side effects from your medication that do not go away or are bothersome.     (Refer to the teaching your nurse gave you for side effects to call your doctor about)    - Common side effects may include:  stuffy nose, headache, feeling tired, muscle aches, upset stomach  - Before receiving any vaccines     - Call [facility] at   If:  - You get sick, are on antibiotics, have had a recent vaccine, have surgery or dental work and your doctor wants your visit rescheduled.  - You need to cancel and reschedule your visit for any reason. Call at least 2 days before your visit if you need to cancel.   - Your insurance changes before your next visit.    (We will need to get approval from your new insurance. This can take up to two weeks.)     The [facility] is opened Monday thru Friday. We are closed on weekends and holidays.   Voice mail will take your call if the center is closed. If you leave a message please allow 24 hours for a call back during weekdays. If you leave a  message on a weekend/holiday, we will call you back the next business day.    A pharmacist is available Monday - Friday from 8:30AM to 3:30PM to help answer any questions you may have about your prescriptions(s). Please call pharmacy at:    [facility]: [phone]  [facility]: [phone]  [facility]: [phone]

## 2025-01-10 ENCOUNTER — TELEPHONE (OUTPATIENT)
Dept: PRIMARY CARE | Facility: CLINIC | Age: 75
End: 2025-01-10
Payer: MEDICARE

## 2025-01-10 DIAGNOSIS — J01.90 ACUTE SINUSITIS, RECURRENCE NOT SPECIFIED, UNSPECIFIED LOCATION: ICD-10-CM

## 2025-01-10 RX ORDER — AMOXICILLIN AND CLAVULANATE POTASSIUM 875; 125 MG/1; MG/1
875 TABLET, FILM COATED ORAL 2 TIMES DAILY
Qty: 6 TABLET | Refills: 0 | Status: SHIPPED | OUTPATIENT
Start: 2025-01-10 | End: 2025-01-13

## 2025-01-10 NOTE — TELEPHONE ENCOUNTER
Pt called in and left  for DG NP. Pt was seen IO 1/2/25 for acute sinusitis and was prescribed Amoxicillin that she finished yesterday. Pt is still experiencing some pressure around her eyes and congestion. She is feeling slightly improved but requesting that a few more days of abx be sent in.     Please advise if you would like FU appointment to reevaluate or if med can be sent in.    If able to be sent in - she would like Rx to be sent to [facility] in [place]

## 2025-02-10 DIAGNOSIS — I10 HYPERTENSION, UNSPECIFIED TYPE: ICD-10-CM

## 2025-02-10 RX ORDER — CARVEDILOL 6.25 MG/1
6.25 TABLET ORAL
Qty: 180 TABLET | Refills: 3 | Status: SHIPPED | OUTPATIENT
Start: 2025-02-10

## 2025-06-04 ENCOUNTER — APPOINTMENT (OUTPATIENT)
Dept: PRIMARY CARE | Facility: CLINIC | Age: 75
End: 2025-06-04
Payer: MEDICARE

## 2025-06-19 ENCOUNTER — HOSPITAL ENCOUNTER (OUTPATIENT)
Dept: RADIOLOGY | Facility: CLINIC | Age: 75
Discharge: HOME | End: 2025-06-19
Payer: MEDICARE

## 2025-06-19 ENCOUNTER — OFFICE VISIT (OUTPATIENT)
Dept: PRIMARY CARE | Facility: CLINIC | Age: 75
End: 2025-06-19
Payer: MEDICARE

## 2025-06-19 VITALS
HEIGHT: 68 IN | DIASTOLIC BLOOD PRESSURE: 77 MMHG | SYSTOLIC BLOOD PRESSURE: 163 MMHG | TEMPERATURE: 97.6 F | OXYGEN SATURATION: 98 % | WEIGHT: 152 LBS | BODY MASS INDEX: 23.04 KG/M2 | HEART RATE: 64 BPM

## 2025-06-19 DIAGNOSIS — M25.441 FINGER JOINT SWELLING, RIGHT: ICD-10-CM

## 2025-06-19 DIAGNOSIS — M25.551 HIP PAIN, RIGHT: ICD-10-CM

## 2025-06-19 DIAGNOSIS — R25.2 LEG CRAMPS: ICD-10-CM

## 2025-06-19 DIAGNOSIS — M25.50 MULTIPLE JOINT PAIN: Primary | ICD-10-CM

## 2025-06-19 PROCEDURE — 1036F TOBACCO NON-USER: CPT | Performed by: NURSE PRACTITIONER

## 2025-06-19 PROCEDURE — 73120 X-RAY EXAM OF HAND: CPT | Mod: RT

## 2025-06-19 PROCEDURE — 99214 OFFICE O/P EST MOD 30 MIN: CPT | Performed by: NURSE PRACTITIONER

## 2025-06-19 PROCEDURE — 3078F DIAST BP <80 MM HG: CPT | Performed by: NURSE PRACTITIONER

## 2025-06-19 PROCEDURE — 3077F SYST BP >= 140 MM HG: CPT | Performed by: NURSE PRACTITIONER

## 2025-06-19 PROCEDURE — 1160F RVW MEDS BY RX/DR IN RCRD: CPT | Performed by: NURSE PRACTITIONER

## 2025-06-19 PROCEDURE — 1159F MED LIST DOCD IN RCRD: CPT | Performed by: NURSE PRACTITIONER

## 2025-06-19 PROCEDURE — G2211 COMPLEX E/M VISIT ADD ON: HCPCS | Performed by: NURSE PRACTITIONER

## 2025-06-19 PROCEDURE — 3008F BODY MASS INDEX DOCD: CPT | Performed by: NURSE PRACTITIONER

## 2025-06-19 PROCEDURE — 73502 X-RAY EXAM HIP UNI 2-3 VIEWS: CPT | Mod: RT

## 2025-06-19 ASSESSMENT — ENCOUNTER SYMPTOMS: PAIN: 1

## 2025-06-19 ASSESSMENT — PATIENT HEALTH QUESTIONNAIRE - PHQ9
SUM OF ALL RESPONSES TO PHQ9 QUESTIONS 1 AND 2: 0
1. LITTLE INTEREST OR PLEASURE IN DOING THINGS: NOT AT ALL
2. FEELING DOWN, DEPRESSED OR HOPELESS: NOT AT ALL

## 2025-06-19 NOTE — PROGRESS NOTES
Problem List Items Addressed This Visit          Medium    Hip pain, right    Current Assessment & Plan   Start with XR           Relevant Orders    XR hip right with pelvis when performed 2 or 3 views     Other Visit Diagnoses         Multiple joint pain    -  Primary    she's concerned for RA (sisters)  reviewed labs  will repeat now    Relevant Orders    ALISHA with Reflex to YAN    Rheumatoid factor    Sedimentation Rate    C-reactive protein    Uric acid      Finger joint swelling, right        XR  uric acid  ? pseudogout  she querries RA (sisters)    Relevant Orders    XR hand right 1-2 views      Leg cramps        ? secondary to hip pain    Relevant Orders    ALISHA with Reflex to YAN    Rheumatoid factor    Sedimentation Rate    C-reactive protein    Uric acid    XR hip right with pelvis when performed 2 or 3 views    Comprehensive Metabolic Panel    Magnesium    CBC and Auto Differential             Subjective   Patient ID: Xochilt Brock is a 74 y.o. female who presents for Pain (Joint pain /Sx's just started /2 sisters have been Dx with RA /Mostly forefinger and knuckle /Joint became stiff and swelled ( 3 days) /Cramping in legs in the middle of the night - 3 weeks ).  Pain      R single finger  4th digit  Swollen and couldn't bend her finger  Better today  No erythema or warmth  No injury     Leg cramps x 3 weeks  Bilat legs  Woke her from sleep  Always at night  - sound asleep  Now R leg only  - entire posterior  March last air travel  [place] couple weeks ago  No hx clots    Today ache in L shoulder down her arm    No dyspnea  No hemoptysis    No new vits, herbals, supps or teas  Last reclast Jan/Feb    Component      Latest Ref Rng 10/28/2024 11/29/2024   WBC      4.4 - 11.3 x10*3/uL 5.5     nRBC      0.0 - 0.0 /100 WBCs 0.0     RBC      4.00 - 5.20 x10*6/uL 4.60     HEMOGLOBIN      12.0 - 16.0 g/dL 14.4     HEMATOCRIT      36.0 - 46.0 % 43.0     MCV      80 - 100 fL 94     MCH      26.0 - 34.0 pg 31.3      MCHC      32.0 - 36.0 g/dL 33.5     RED CELL DISTRIBUTION WIDTH      11.5 - 14.5 % 12.4     Platelets      150 - 450 x10*3/uL 250     Neutrophils %      40.0 - 80.0 % 58.6     Immature Granulocytes %, Automated      0.0 - 0.9 % 0.2     Lymphocytes %      13.0 - 44.0 % 25.7     Monocytes %      2.0 - 10.0 % 10.2     Eosinophils %      0.0 - 6.0 % 3.5     Basophils %      0.0 - 2.0 % 1.8     Neutrophils Absolute      1.60 - 5.50 x10*3/uL 3.22     Immature Granulocytes Absolute, Automated      0.00 - 0.50 x10*3/uL 0.01     Lymphocytes Absolute      0.80 - 3.00 x10*3/uL 1.41     Monocytes Absolute      0.05 - 0.80 x10*3/uL 0.56     Eosinophils Absolute      0.00 - 0.40 x10*3/uL 0.19     Basophils Absolute      0.00 - 0.10 x10*3/uL 0.10     GLUCOSE      74 - 99 mg/dL 114 (H)     SODIUM      136 - 145 mmol/L 139     POTASSIUM      3.5 - 5.3 mmol/L 4.9     CHLORIDE      98 - 107 mmol/L 103     Bicarbonate      21 - 32 mmol/L 30     Anion Gap      10 - 20 mmol/L 11     Blood Urea Nitrogen      6 - 23 mg/dL 21     Creatinine      0.50 - 1.05 mg/dL 0.94     EGFR      >60 mL/min/1.73m*2 65     Calcium      8.6 - 10.3 mg/dL 10.0     Albumin      3.4 - 5.0 g/dL 4.5     Alkaline Phosphatase      33 - 136 U/L 57     Total Protein      6.4 - 8.2 g/dL 7.2     AST      9 - 39 U/L 21     Bilirubin Total      0.0 - 1.2 mg/dL 1.2     ALT      7 - 45 U/L 19     Color, Urine      Light-Yellow, Yellow, Dark-Yellow  Light-Yellow  Yellow    Appearance, Urine      Clear  Clear  Clear    Specific Gravity, Urine      1.005 - 1.035  1.013  1.023    pH, Urine      5.0, 5.5, 6.0, 6.5, 7.0, 7.5, 8.0  5.0  5.5    Protein, Urine      NEGATIVE, 10 (TRACE), 20 (TRACE) mg/dL NEGATIVE  10 (TRACE)    Glucose, Urine      Normal mg/dL Normal  Normal    Blood, Urine      NEGATIVE  0.03 (TRACE) !  NEGATIVE    Ketones, Urine      NEGATIVE mg/dL NEGATIVE  NEGATIVE    Bilirubin, Urine      NEGATIVE  NEGATIVE  NEGATIVE    Urobilinogen, Urine      Normal mg/dL  Normal  Normal    Nitrite, Urine      NEGATIVE  NEGATIVE  NEGATIVE    Leukocyte Esterase, Urine      NEGATIVE  NEGATIVE  NEGATIVE    CHOLESTEROL      0 - 199 mg/dL 172     HDL CHOLESTEROL      mg/dL 53.1     Cholesterol/HDL Ratio 3.2     LDL Calculated      <=99 mg/dL 97     VLDL      0 - 40 mg/dL 22     TRIGLYCERIDES      0 - 149 mg/dL 112     Non HDL Cholesterol      0 - 149 mg/dL 119     WBC, Urine      1-5, NONE /HPF 1-5  1-5    RBC, Urine      NONE, 1-2, 3-5 /HPF 1-2  1-2    Mucus, Urine      Reference range not established. /LPF FEW  FEW    Hyaline Casts, Urine      NONE /LPF  OCCASIONAL !    Hemoglobin A1C      See comment % 5.5     Estimated Average Glucose      Not Established mg/dL 111     Thyroid Stimulating Hormone      0.44 - 3.98 mIU/L 1.36     Vitamin D, 25-Hydroxy, Total      30 - 100 ng/mL 49     Vitamin B12      211 - 911 pg/mL 699       Component      Latest Ref Rng 1/2/2025   WBC      4.4 - 11.3 x10*3/uL    nRBC      0.0 - 0.0 /100 WBCs    RBC      4.00 - 5.20 x10*6/uL    HEMOGLOBIN      12.0 - 16.0 g/dL    HEMATOCRIT      36.0 - 46.0 %    MCV      80 - 100 fL    MCH      26.0 - 34.0 pg    MCHC      32.0 - 36.0 g/dL    RED CELL DISTRIBUTION WIDTH      11.5 - 14.5 %    Platelets      150 - 450 x10*3/uL    Neutrophils %      40.0 - 80.0 %    Immature Granulocytes %, Automated      0.0 - 0.9 %    Lymphocytes %      13.0 - 44.0 %    Monocytes %      2.0 - 10.0 %    Eosinophils %      0.0 - 6.0 %    Basophils %      0.0 - 2.0 %    Neutrophils Absolute      1.60 - 5.50 x10*3/uL    Immature Granulocytes Absolute, Automated      0.00 - 0.50 x10*3/uL    Lymphocytes Absolute      0.80 - 3.00 x10*3/uL    Monocytes Absolute      0.05 - 0.80 x10*3/uL    Eosinophils Absolute      0.00 - 0.40 x10*3/uL    Basophils Absolute      0.00 - 0.10 x10*3/uL    GLUCOSE      74 - 99 mg/dL 118 (H)    SODIUM      136 - 145 mmol/L 141    POTASSIUM      3.5 - 5.3 mmol/L 4.6    CHLORIDE      98 - 107 mmol/L 104     Bicarbonate      21 - 32 mmol/L 31    Anion Gap      10 - 20 mmol/L 11    Blood Urea Nitrogen      6 - 23 mg/dL 18    Creatinine      0.50 - 1.05 mg/dL 0.98    EGFR      >60 mL/min/1.73m*2 61    Calcium      8.6 - 10.3 mg/dL 10.2    Albumin      3.4 - 5.0 g/dL 4.4    Alkaline Phosphatase      33 - 136 U/L 61    Total Protein      6.4 - 8.2 g/dL 7.3    AST      9 - 39 U/L 30    Bilirubin Total      0.0 - 1.2 mg/dL 0.7    ALT      7 - 45 U/L 35    Color, Urine      Light-Yellow, Yellow, Dark-Yellow  Yellow    Appearance, Urine      Clear  Clear    Specific Gravity, Urine      1.005 - 1.035  1.022    pH, Urine      5.0, 5.5, 6.0, 6.5, 7.0, 7.5, 8.0  5.0    Protein, Urine      NEGATIVE, 10 (TRACE), 20 (TRACE) mg/dL NEGATIVE    Glucose, Urine      Normal mg/dL Normal    Blood, Urine      NEGATIVE  0.03 (TRACE) !    Ketones, Urine      NEGATIVE mg/dL NEGATIVE    Bilirubin, Urine      NEGATIVE  NEGATIVE    Urobilinogen, Urine      Normal mg/dL Normal    Nitrite, Urine      NEGATIVE  NEGATIVE    Leukocyte Esterase, Urine      NEGATIVE  NEGATIVE    CHOLESTEROL      0 - 199 mg/dL    HDL CHOLESTEROL      mg/dL    Cholesterol/HDL Ratio    LDL Calculated      <=99 mg/dL    VLDL      0 - 40 mg/dL    TRIGLYCERIDES      0 - 149 mg/dL    Non HDL Cholesterol      0 - 149 mg/dL    WBC, Urine      1-5, NONE /HPF 1-5    RBC, Urine      NONE, 1-2, 3-5 /HPF 1-2    Mucus, Urine      Reference range not established. /LPF FEW    Hyaline Casts, Urine      NONE /LPF    Hemoglobin A1C      See comment %    Estimated Average Glucose      Not Established mg/dL    Thyroid Stimulating Hormone      0.44 - 3.98 mIU/L    Vitamin D, 25-Hydroxy, Total      30 - 100 ng/mL 40    Vitamin B12      211 - 911 pg/mL       Component      Latest Ref Rn 2/23/2022   Albumin      3.4 - 5.0 g/dL 4.5    Bilirubin Total      0.0 - 1.2 mg/dL 1.0    Bilirubin, Direct      0.0 - 0.3 mg/dL 0.1    Alkaline Phosphatase      33 - 136 U/L 40    ALT      7 - 45 U/L 25   "  AST      9 - 39 U/L 22    Total Protein      6.4 - 8.2 g/dL 7.3    Testosterone, Total, LC-MS/MS      2 - 45 ng/dL 26    Testosterone, Free      0.1 - 6.4 pg/mL 3.8    Sed Rate      0 - 20 mm/h 6    Anti-SSB      AI <0.2    Anti-SSA      AI <0.2    ALISHA      NEGATIVE  NEGATIVE    Rheumatoid Factor      0 - 15 IU/mL <10        Review of Systems   All other systems reviewed and are negative.      BP Readings from Last 3 Encounters:   06/19/25 163/77   01/09/25 162/85   01/02/25 147/87      Wt Readings from Last 3 Encounters:   06/19/25 68.9 kg (152 lb)   01/02/25 68.5 kg (151 lb)   11/29/24 68.5 kg (151 lb)      BMI:   Estimated body mass index is 23.11 kg/m² as calculated from the following:    Height as of this encounter: 1.727 m (5' 8\").    Weight as of this encounter: 68.9 kg (152 lb).    Objective   Physical Exam  Constitutional:       General: She is not in acute distress.  HENT:      Head: Normocephalic and atraumatic.      Nose: Nose normal.      Mouth/Throat:      Mouth: Mucous membranes are moist.   Eyes:      Extraocular Movements: Extraocular movements intact.      Conjunctiva/sclera: Conjunctivae normal.   Neck:      Vascular: No carotid bruit.   Cardiovascular:      Rate and Rhythm: Normal rate and regular rhythm.      Pulses: Normal pulses.   Pulmonary:      Effort: Pulmonary effort is normal.      Breath sounds: Normal breath sounds.   Abdominal:      General: Bowel sounds are normal.      Palpations: Abdomen is soft.   Musculoskeletal:         General: Normal range of motion.      Cervical back: Normal range of motion and neck supple.      Comments: Spine is non tender. R hip painful to palpation. No appreciable edema or erythema    Lymphadenopathy:      Cervical: No cervical adenopathy.   Skin:     General: Skin is warm and dry.   Neurological:      General: No focal deficit present.      Mental Status: She is alert.   Psychiatric:         Mood and Affect: Mood normal.       "

## 2025-06-20 LAB
ALBUMIN SERPL-MCNC: 4.5 G/DL (ref 3.6–5.1)
ALP SERPL-CCNC: 49 U/L (ref 37–153)
ALT SERPL-CCNC: 20 U/L (ref 6–29)
ANA SER QL IF: NEGATIVE
ANION GAP SERPL CALCULATED.4IONS-SCNC: 10 MMOL/L (CALC) (ref 7–17)
AST SERPL-CCNC: 26 U/L (ref 10–35)
BASOPHILS # BLD AUTO: 102 CELLS/UL (ref 0–200)
BASOPHILS NFR BLD AUTO: 1.4 %
BILIRUB SERPL-MCNC: 1.2 MG/DL (ref 0.2–1.2)
BUN SERPL-MCNC: 18 MG/DL (ref 7–25)
CALCIUM SERPL-MCNC: 9.9 MG/DL (ref 8.6–10.4)
CHLORIDE SERPL-SCNC: 104 MMOL/L (ref 98–110)
CO2 SERPL-SCNC: 26 MMOL/L (ref 20–32)
CREAT SERPL-MCNC: 0.88 MG/DL (ref 0.6–1)
CRP SERPL-MCNC: <3 MG/L
EGFRCR SERPLBLD CKD-EPI 2021: 70 ML/MIN/1.73M2
EOSINOPHIL # BLD AUTO: 226 CELLS/UL (ref 15–500)
EOSINOPHIL NFR BLD AUTO: 3.1 %
ERYTHROCYTE [DISTWIDTH] IN BLOOD BY AUTOMATED COUNT: 12.6 % (ref 11–15)
ERYTHROCYTE [SEDIMENTATION RATE] IN BLOOD BY WESTERGREN METHOD: 2 MM/H
GLUCOSE SERPL-MCNC: 85 MG/DL (ref 65–99)
HCT VFR BLD AUTO: 41.9 % (ref 35–45)
HGB BLD-MCNC: 14 G/DL (ref 11.7–15.5)
LYMPHOCYTES # BLD AUTO: 2409 CELLS/UL (ref 850–3900)
LYMPHOCYTES NFR BLD AUTO: 33 %
MAGNESIUM SERPL-MCNC: 2.3 MG/DL (ref 1.5–2.5)
MCH RBC QN AUTO: 31.7 PG (ref 27–33)
MCHC RBC AUTO-ENTMCNC: 33.4 G/DL (ref 32–36)
MCV RBC AUTO: 95 FL (ref 80–100)
MONOCYTES # BLD AUTO: 715 CELLS/UL (ref 200–950)
MONOCYTES NFR BLD AUTO: 9.8 %
NEUTROPHILS # BLD AUTO: 3847 CELLS/UL (ref 1500–7800)
NEUTROPHILS NFR BLD AUTO: 52.7 %
PLATELET # BLD AUTO: 280 THOUSAND/UL (ref 140–400)
PMV BLD REES-ECKER: 10.3 FL (ref 7.5–12.5)
POTASSIUM SERPL-SCNC: 4.7 MMOL/L (ref 3.5–5.3)
PROT SERPL-MCNC: 7.1 G/DL (ref 6.1–8.1)
RBC # BLD AUTO: 4.41 MILLION/UL (ref 3.8–5.1)
RHEUMATOID FACT SERPL-ACNC: <10 IU/ML
SODIUM SERPL-SCNC: 140 MMOL/L (ref 135–146)
URATE SERPL-MCNC: 5.7 MG/DL (ref 2.5–7)
WBC # BLD AUTO: 7.3 THOUSAND/UL (ref 3.8–10.8)

## 2025-06-21 DIAGNOSIS — M15.4 EROSIVE OSTEOARTHRITIS OF HAND: Primary | ICD-10-CM

## 2025-06-21 PROBLEM — M16.0 BILATERAL HIP JOINT ARTHRITIS: Status: ACTIVE | Noted: 2025-06-21

## 2025-07-01 ENCOUNTER — APPOINTMENT (OUTPATIENT)
Dept: NEUROLOGY | Facility: CLINIC | Age: 75
End: 2025-07-01
Payer: MEDICARE

## 2025-07-01 VITALS
BODY MASS INDEX: 22.73 KG/M2 | HEART RATE: 67 BPM | SYSTOLIC BLOOD PRESSURE: 152 MMHG | HEIGHT: 68 IN | WEIGHT: 150 LBS | DIASTOLIC BLOOD PRESSURE: 71 MMHG

## 2025-07-01 DIAGNOSIS — G25.0 ESSENTIAL TREMOR: Primary | ICD-10-CM

## 2025-07-01 PROCEDURE — 99213 OFFICE O/P EST LOW 20 MIN: CPT | Performed by: PSYCHIATRY & NEUROLOGY

## 2025-07-01 PROCEDURE — 1159F MED LIST DOCD IN RCRD: CPT | Performed by: PSYCHIATRY & NEUROLOGY

## 2025-07-01 PROCEDURE — 3078F DIAST BP <80 MM HG: CPT | Performed by: PSYCHIATRY & NEUROLOGY

## 2025-07-01 PROCEDURE — 1036F TOBACCO NON-USER: CPT | Performed by: PSYCHIATRY & NEUROLOGY

## 2025-07-01 PROCEDURE — 3008F BODY MASS INDEX DOCD: CPT | Performed by: PSYCHIATRY & NEUROLOGY

## 2025-07-01 PROCEDURE — G2211 COMPLEX E/M VISIT ADD ON: HCPCS | Performed by: PSYCHIATRY & NEUROLOGY

## 2025-07-01 PROCEDURE — 3077F SYST BP >= 140 MM HG: CPT | Performed by: PSYCHIATRY & NEUROLOGY

## 2025-07-01 ASSESSMENT — UNIFIED PARKINSONS DISEASE RATING SCALE (UPDRS)
GAIT: 0
PARKINSONS_MEDS: NO
RIGIDITY_NECK: 0
TOTAL_SCORE: 4
PRONATION_SUPINATION_LEFT: 0
POSTURAL_TREMOR_RIGHTHAND: 1
FINGER_TAPPING_RIGHT: 0
FREEZING_GAIT: 0
LEG_AGILITY_RIGHT: 0
SPEECH: 0
AMPLITUDE_RLE: 1
KINETIC_TREMOR_RIGHTHAND: 1
AMPLITUDE_LUE: 0
AMPLITUDE_RUE: 0
TOETAPPING_LEFT: 0
POSTURAL_STABILITY: 0
HANDMOVEMENTS_RIGHT: 0
AMPLITUDE_LLE: 1
FACIAL_EXPRESSION: 0
SPONTANEITY_OF_MOVEMENT: 0
PRONATION_SUPINATION_RIGHT: 0
LEG_AGILITY_LEFT: 0
LEVODOPA: NO
POSTURE: 0
RIGIDITY_LLE: 0
RIGIDITY_RUE: 0
POSTURAL_TREMOR_LEFTHAND: 0
TOETAPPING_RIGHT: 0
KINETIC_TREMOR_LEFTHAND: 0
AMPLITUDE_LIP_JAW: 0
FINGER_TAPPING_LEFT: 0
CHAIR_RISING_SCALE: 0
CONSTANCY_TREMOR_ATREST: 0
RIGIDITY_RLE: 0
RIGIDITY_LUE: 0

## 2025-07-01 ASSESSMENT — ENCOUNTER SYMPTOMS
DEPRESSION: 0
OCCASIONAL FEELINGS OF UNSTEADINESS: 0
LOSS OF SENSATION IN FEET: 0

## 2025-07-01 ASSESSMENT — PATIENT HEALTH QUESTIONNAIRE - PHQ9
1. LITTLE INTEREST OR PLEASURE IN DOING THINGS: NOT AT ALL
2. FEELING DOWN, DEPRESSED OR HOPELESS: NOT AT ALL
SUM OF ALL RESPONSES TO PHQ9 QUESTIONS 1 & 2: 0

## 2025-07-01 NOTE — LETTER
July 1, 2025     Aida Santos MD  [address] [facility] [address]  [place] [address]    Patient: Xochilt Brock   YOB: 1950   Date of Visit: 7/1/2025       Dear Dr. Aida Santos MD:    Thank you for referring Xochilt Brock to me for evaluation. Below are my notes for this consultation.  If you have questions, please do not hesitate to call me. I look forward to following your patient along with you.       Sincerely,     Hilaria Estevez MD      CC: No Recipients  ______________________________________________________________________________________    Subjective    Xochilt Brock is a right handed  74 y.o. year old female who presents with No chief complaint on file..   Visit type: follow up.       Continues to have tremor in the R hand, it is worse when she is stressed  or some days are worse for no clear reason. Eye make up would be a challenge on those days, cross stitching is harder. Handwriting is a little messy, not smaller though.   Can order whatever wants at restaurant, can drink from glass full. No resting tremor,   Very slight head tremor that her kids have noticed, she cannot feel it.     No shuffling, no stiffness and slowness.   Sleep is ok, no RBD like behavior, no hyposmia, no constipation.         Prior history:   Patient diagnosed with ET about 6 years ago. Was never on medication for it. Noticed progression of tremor to involve head about 3 years ago(noticed by family members) and wanted to get it checked.  States that 5 years ago noticed shaking of R hand. Sometimes affect hand writing or other meticulous tasks like applying makeup. Worsens as the day goes back. Head tremor does not interfere with any activity. Worsened by stress, improves with alcohol. Still does not feel like she needs medications for it. Just wanted to make sure that she should not be concerned. No blood relative with PD.    Denies any fall, freezing of gait, can keep up w peers when walking, no changes  in voice, constipation, no changes in vision, denies acting out dreams, denies urinary symptoms, reports good sense of smell and taste.      TSH and iron checked within past 6 months and wnl.    Patient Active Problem List   Diagnosis   • Elevated blood sugar   • Essential tremor   • Gastroesophageal reflux disease   • Hematuria   • Hip pain, left   • Hip pain, right   • Mixed hyperlipidemia   • Hypertension   • Postconcussion syndrome   • Knee osteoarthritis   • Knee pain   • Liver function study, abnormal   • Low back pain   • Osteopenia   • Other diseases of vocal cords   • Sciatica   • Sore throat   • Vitamin D deficiency   • Watery stools   • Weakness of trunk musculature   • CKD (chronic kidney disease) stage 3, GFR 30-59 ml/min (Multi)   • Closed head injury   • Sacroiliac joint dysfunction of right side   • UPJ (ureteropelvic junction) obstruction   • Splenic artery aneurysm   • Allergic rhinitis   • Avulsion of toenail   • Cyst of skin   • Fatigue   • Iron deficiency   • Subdural hematoma (Multi)   • Erosive osteoarthritis of hand   • Bilateral hip joint arthritis      Past Medical History:   Diagnosis Date   • Abscess of eyelid right eye, unspecified eyelid 06/15/2020    Cellulitis of right eyelid   • Age-related osteoporosis without current pathological fracture 02/22/2021    Age related osteoporosis   • Body mass index (BMI) 24.0-24.9, adult 11/18/2020    Body mass index (BMI) of 24.0 to 24.9 in adult   • Chronic sinusitis, unspecified 03/02/2021    Other sinusitis   • Encounter for other screening for malignant neoplasm of breast 02/22/2021    Breast screening   • Encounter for screening for depression 11/18/2020    Depression screen   • Encounter for screening for malignant neoplasm of cervix 11/18/2020    Cervical cancer screening   • Headache, unspecified 02/23/2022    Scalp pain   • Personal history of other diseases of the circulatory system 03/02/2021    History of subdural hematoma   • Personal  history of other diseases of the respiratory system 11/13/2018    History of allergic rhinitis   • Personal history of other medical treatment     H/O bone density study      Past Surgical History:   Procedure Laterality Date   • CT ABDOMEN PELVIS ANGIOGRAM W AND/OR WO IV CONTRAST  10/11/2023    CT ABDOMEN PELVIS ANGIOGRAM W AND/OR WO IV CONTRAST 10/11/2023 [id number]   • OTHER SURGICAL HISTORY  11/13/2018    Kidney surgery   • OTHER SURGICAL HISTORY  11/13/2018    Rotator cuff repair   • OTHER SURGICAL HISTORY  11/13/2018    Nephrectomy   • OTHER SURGICAL HISTORY  2012 Colonoscopy 2023 polyp      Social History     Socioeconomic History   • Marital status:      Spouse name: Not on file   • Number of children: Not on file   • Years of education: Not on file   • Highest education level: Not on file   Occupational History   • Not on file   Tobacco Use   • Smoking status: Never   • Smokeless tobacco: Never   Substance and Sexual Activity   • Alcohol use: Not Currently     Comment: social   • Drug use: Never   • Sexual activity: Not on file   Other Topics Concern   • Not on file   Social History Narrative   • Not on file     Social Drivers of Health     Financial Resource Strain: Not on file   Food Insecurity: No Food Insecurity (10/28/2024)    Hunger Vital Sign    • Worried About Running Out of Food in the Last Year: Never true    • Ran Out of Food in the Last Year: Never true   Transportation Needs: No Transportation Needs (10/28/2024)    PRAPARE - Transportation    • Lack of Transportation (Medical): No    • Lack of Transportation (Non-Medical): No   Physical Activity: Not on file   Stress: Not on file   Social Connections: Not on file   Intimate Partner Violence: Not on file   Housing Stability: Unknown (10/28/2024)    Housing Stability Vital Sign    • Unable to Pay for Housing in the Last Year: No    • Number of Times Moved in the Last Year: Not on file    • Homeless in the Last Year: No      Family  History   Problem Relation Name Age of Onset   • No Known Problems Mother          100   • Alcohol abuse Father     • Stroke Father  74   • Cardiomyopathy Sister     • Alcohol abuse Sister     • Cardiomyopathy Brother     • Breast cancer Paternal Grandmother     • Ovarian cancer Paternal Grandmother                   Review of Systems  All other system have been reviewed and are negative for complaint.  Objective  Vitals:    25 0910   BP: 152/71   Pulse: 67      Neurological Exam        MDS UPDRS 1st Score: Motor Examination  Is the patient on medication for treating the symptoms of Parkinson's Disease?: No  Is the patient on Levodopa?: No  Speech: 0  Facial Expression: 0  Rigidty Neck: 0  Rigidty RUE: 0  Rigidity - LUE: 0  Rigidity RLE: 0  Rigidity LLE: 0  Finger Tapping Right Hand: 0  Finger Tapping Left Hand: 0  Hand Movements- Right Hand: 0  Hand Movements- Left Hand: 0  Pronatiaon-Supination Movments - Right Hand: 0  Pronatiaon-Supination Movments Left Hand: 0  Toe Tapping Right Foot: 0  Toe Tapping - Left Foot: 0  Leg Agility - Right Le  Leg Agility - Left le  Arising from Chair: 0  Gait: 0  Freezing of Gait: 0  Postural Stability: 0  Posture: 0  Global Spontanteity of Movment ( Body Bradykinesia): 0  Postural Tremor - Right Hand: 1  Postural Tremor - Left hand: 0  Kinetic Tremor - Right hand: 1  Kinetic Tremor - Left hand: 0  Rest Tremor Amplitude - RUE: 0  Rest Tremor Amplitude - LUE: 0  Rest Tremor Amplitude - RLE: 1  Rest Tremor Amplitude - LLE: 1  Rest Tremor Amplitude - Lip/Jaw: 0  Constancy of Rest Tremor: 0  MDS UPDRS Total Score: 4     Movement specific examination:  Normal tone, no bradykinesia. Gait is brisk with normal arm swing.   High frequency low amplitude postural and kinetic tremor in R arm.   Archimedes spirals mildly tremulous. Handwriting is large and legible.   Noticed a slight tremor in bilateral LE when seated, but of high frequency and low amplitude.                  Hemoglobin A1C   Date Value Ref Range Status   10/28/2024 5.5 See comment % Final     Estimated Average Glucose   Date Value Ref Range Status   10/28/2024 111 Not Established mg/dL Final     Thyroid Stimulating Hormone   Date Value Ref Range Status   10/28/2024 1.36 0.44 - 3.98 mIU/L Final         Assessment/Plan    Xochilt Brock is a 74 y.o. year old female with hx on HTN (on Coreg), here for follow up of ET. Diagnosed around 2018 with R hand tremor and in past 2 years noted involvement of head.  Agreeable to hold off medication for now as it does not interfere with daily activities. Patient to reach out to our office if she would like to start medication. Follow up in 1 year.     Treatment's for essential tremor include several pharmacological agents. The American Academy of Neurology guidelines for essential tremor, gives Level A evidence to the use of Propranolol and primidone. There is level B evidence for gabapentin, topamax and benzodiazepines. (Sangeeta et al Neurology 2005, with update in 2011).     This is a chronic neurologic condition that requires ongoing care and monitoring. This is a complex, serious condition that needs long term care going forward. Between myself and the patient we will be changing direction of care depending on responses to treatment.   Today we discussed medication options, non medication options for management and various other symptoms that are in relation to this disease.  I will continue to be involved in the care of this patient.

## 2025-07-01 NOTE — PATIENT INSTRUCTIONS
It was very nice to see you again.   You have a mild essential tremor.   NO evidence of parkinsonism.   RTC in 1 year.

## 2025-07-01 NOTE — PROGRESS NOTES
Subjective     Xochilt Brock is a right handed  74 y.o. year old female who presents with No chief complaint on file..   Visit type: follow up.       Continues to have tremor in the R hand, it is worse when she is stressed  or some days are worse for no clear reason. Eye make up would be a challenge on those days, cross stitching is harder. Handwriting is a little messy, not smaller though.   Can order whatever wants at restaurant, can drink from glass full. No resting tremor,   Very slight head tremor that her kids have noticed, she cannot feel it.     No shuffling, no stiffness and slowness.   Sleep is ok, no RBD like behavior, no hyposmia, no constipation.         Prior history:   Patient diagnosed with ET about 6 years ago. Was never on medication for it. Noticed progression of tremor to involve head about 3 years ago(noticed by family members) and wanted to get it checked.  States that 5 years ago noticed shaking of R hand. Sometimes affect hand writing or other meticulous tasks like applying makeup. Worsens as the day goes back. Head tremor does not interfere with any activity. Worsened by stress, improves with alcohol. Still does not feel like she needs medications for it. Just wanted to make sure that she should not be concerned. No blood relative with PD.    Denies any fall, freezing of gait, can keep up w peers when walking, no changes in voice, constipation, no changes in vision, denies acting out dreams, denies urinary symptoms, reports good sense of smell and taste.      TSH and iron checked within past 6 months and wnl.    Patient Active Problem List   Diagnosis    Elevated blood sugar    Essential tremor    Gastroesophageal reflux disease    Hematuria    Hip pain, left    Hip pain, right    Mixed hyperlipidemia    Hypertension    Postconcussion syndrome    Knee osteoarthritis    Knee pain    Liver function study, abnormal    Low back pain    Osteopenia    Other diseases of vocal cords    Sciatica     Sore throat    Vitamin D deficiency    Watery stools    Weakness of trunk musculature    CKD (chronic kidney disease) stage 3, GFR 30-59 ml/min (Multi)    Closed head injury    Sacroiliac joint dysfunction of right side    UPJ (ureteropelvic junction) obstruction    Splenic artery aneurysm    Allergic rhinitis    Avulsion of toenail    Cyst of skin    Fatigue    Iron deficiency    Subdural hematoma (Multi)    Erosive osteoarthritis of hand    Bilateral hip joint arthritis      Past Medical History:   Diagnosis Date    Abscess of eyelid right eye, unspecified eyelid 06/15/2020    Cellulitis of right eyelid    Age-related osteoporosis without current pathological fracture 02/22/2021    Age related osteoporosis    Body mass index (BMI) 24.0-24.9, adult 11/18/2020    Body mass index (BMI) of 24.0 to 24.9 in adult    Chronic sinusitis, unspecified 03/02/2021    Other sinusitis    Encounter for other screening for malignant neoplasm of breast 02/22/2021    Breast screening    Encounter for screening for depression 11/18/2020    Depression screen    Encounter for screening for malignant neoplasm of cervix 11/18/2020    Cervical cancer screening    Headache, unspecified 02/23/2022    Scalp pain    Personal history of other diseases of the circulatory system 03/02/2021    History of subdural hematoma    Personal history of other diseases of the respiratory system 11/13/2018    History of allergic rhinitis    Personal history of other medical treatment     H/O bone density study      Past Surgical History:   Procedure Laterality Date    CT ABDOMEN PELVIS ANGIOGRAM W AND/OR WO IV CONTRAST  10/11/2023    CT ABDOMEN PELVIS ANGIOGRAM W AND/OR WO IV CONTRAST 10/11/2023 [id number]    OTHER SURGICAL HISTORY  11/13/2018    Kidney surgery    OTHER SURGICAL HISTORY  11/13/2018    Rotator cuff repair    OTHER SURGICAL HISTORY  11/13/2018    Nephrectomy    OTHER SURGICAL HISTORY  2012    Colonoscopy 2023 polyp      Social History      Socioeconomic History    Marital status:      Spouse name: Not on file    Number of children: Not on file    Years of education: Not on file    Highest education level: Not on file   Occupational History    Not on file   Tobacco Use    Smoking status: Never    Smokeless tobacco: Never   Substance and Sexual Activity    Alcohol use: Not Currently     Comment: social    Drug use: Never    Sexual activity: Not on file   Other Topics Concern    Not on file   Social History Narrative    Not on file     Social Drivers of Health     Financial Resource Strain: Not on file   Food Insecurity: No Food Insecurity (10/28/2024)    Hunger Vital Sign     Worried About Running Out of Food in the Last Year: Never true     Ran Out of Food in the Last Year: Never true   Transportation Needs: No Transportation Needs (10/28/2024)    PRAPARE - Transportation     Lack of Transportation (Medical): No     Lack of Transportation (Non-Medical): No   Physical Activity: Not on file   Stress: Not on file   Social Connections: Not on file   Intimate Partner Violence: Not on file   Housing Stability: Unknown (10/28/2024)    Housing Stability Vital Sign     Unable to Pay for Housing in the Last Year: No     Number of Times Moved in the Last Year: Not on file     Homeless in the Last Year: No      Family History   Problem Relation Name Age of Onset    No Known Problems Mother          100    Alcohol abuse Father      Stroke Father  74    Cardiomyopathy Sister      Alcohol abuse Sister      Cardiomyopathy Brother      Breast cancer Paternal Grandmother      Ovarian cancer Paternal Grandmother                   Review of Systems  All other system have been reviewed and are negative for complaint.  Objective   Vitals:    07/01/25 0910   BP: 152/71   Pulse: 67      Neurological Exam        MDS UPDRS 1st Score: Motor Examination  Is the patient on medication for treating the symptoms of Parkinson's Disease?: No  Is the patient on Levodopa?:  No  Speech: 0  Facial Expression: 0  Rigidty Neck: 0  Rigidty RUE: 0  Rigidity - LUE: 0  Rigidity RLE: 0  Rigidity LLE: 0  Finger Tapping Right Hand: 0  Finger Tapping Left Hand: 0  Hand Movements- Right Hand: 0  Hand Movements- Left Hand: 0  Pronatiaon-Supination Movments - Right Hand: 0  Pronatiaon-Supination Movments Left Hand: 0  Toe Tapping Right Foot: 0  Toe Tapping - Left Foot: 0  Leg Agility - Right Le  Leg Agility - Left le  Arising from Chair: 0  Gait: 0  Freezing of Gait: 0  Postural Stability: 0  Posture: 0  Global Spontanteity of Movment ( Body Bradykinesia): 0  Postural Tremor - Right Hand: 1  Postural Tremor - Left hand: 0  Kinetic Tremor - Right hand: 1  Kinetic Tremor - Left hand: 0  Rest Tremor Amplitude - RUE: 0  Rest Tremor Amplitude - LUE: 0  Rest Tremor Amplitude - RLE: 1  Rest Tremor Amplitude - LLE: 1  Rest Tremor Amplitude - Lip/Jaw: 0  Constancy of Rest Tremor: 0  MDS UPDRS Total Score: 4     Movement specific examination:  Normal tone, no bradykinesia. Gait is brisk with normal arm swing.   High frequency low amplitude postural and kinetic tremor in R arm.   Archimedes spirals mildly tremulous. Handwriting is large and legible.   Noticed a slight tremor in bilateral LE when seated, but of high frequency and low amplitude.                 Hemoglobin A1C   Date Value Ref Range Status   10/28/2024 5.5 See comment % Final     Estimated Average Glucose   Date Value Ref Range Status   10/28/2024 111 Not Established mg/dL Final     Thyroid Stimulating Hormone   Date Value Ref Range Status   10/28/2024 1.36 0.44 - 3.98 mIU/L Final         Assessment/Plan     Xochilt Brock is a 74 y.o. year old female with hx on HTN (on Coreg), here for follow up of ET. Diagnosed around 2018 with R hand tremor and in past 2 years noted involvement of head.  Agreeable to hold off medication for now as it does not interfere with daily activities. Patient to reach out to our office if she would like to start  medication. Follow up in 1 year.     Treatment's for essential tremor include several pharmacological agents. The American Academy of Neurology guidelines for essential tremor, gives Level A evidence to the use of Propranolol and primidone. There is level B evidence for gabapentin, topamax and benzodiazepines. (Sangeeta et al Neurology 2005, with update in 2011).     This is a chronic neurologic condition that requires ongoing care and monitoring. This is a complex, serious condition that needs long term care going forward. Between myself and the patient we will be changing direction of care depending on responses to treatment.   Today we discussed medication options, non medication options for management and various other symptoms that are in relation to this disease.  I will continue to be involved in the care of this patient.

## 2025-07-16 ENCOUNTER — APPOINTMENT (OUTPATIENT)
Dept: ORTHOPEDIC SURGERY | Facility: CLINIC | Age: 75
End: 2025-07-16
Payer: MEDICARE

## 2025-07-16 DIAGNOSIS — M25.441 FINGER JOINT SWELLING, RIGHT: Primary | ICD-10-CM

## 2025-07-16 PROCEDURE — 1036F TOBACCO NON-USER: CPT | Performed by: ORTHOPAEDIC SURGERY

## 2025-07-16 PROCEDURE — 1160F RVW MEDS BY RX/DR IN RCRD: CPT | Performed by: ORTHOPAEDIC SURGERY

## 2025-07-16 PROCEDURE — 99212 OFFICE O/P EST SF 10 MIN: CPT | Performed by: ORTHOPAEDIC SURGERY

## 2025-07-16 PROCEDURE — 1159F MED LIST DOCD IN RCRD: CPT | Performed by: ORTHOPAEDIC SURGERY

## 2025-07-16 PROCEDURE — 99204 OFFICE O/P NEW MOD 45 MIN: CPT | Performed by: ORTHOPAEDIC SURGERY

## 2025-07-16 ASSESSMENT — PAIN - FUNCTIONAL ASSESSMENT: PAIN_FUNCTIONAL_ASSESSMENT: NO/DENIES PAIN

## 2025-07-16 NOTE — LETTER
August 4, 2025     Aida Santos MD  [address] [facility] [address]  [facility] [address]    Patient: Xochilt Brock   YOB: 1950   Date of Visit: 7/16/2025       Dear Dr. Aida Santos MD:    Thank you for referring Xochilt Brock to me for evaluation. Below are my notes for this consultation.  If you have questions, please do not hesitate to call me. I look forward to following your patient along with you.       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  I reviewed the options for management, including observation, nonsteroidals, activity modification, splinting, oral steroids, cortisone injection, or surgical joint replacement or fusion, along with the major risks and benefits, and likely success rates of each.     The patient did not want anything invasive at this time, and we will continue with activity modification, splints, and nonsteroidals as needed, and followup with any concerns.        HISTORY OF PRESENT ILLNESS       Patient is a 74 y.o. right-hand dominant female retiree, who presents today for evaluation of intermittent pain and swelling of multiple joints in the right hand.  The index finger has been flaring and stiff over the last 5 or 6 weeks.  No single specific recalled trauma.  She has had similar episodes in the long and ring intermittently in the past.  Those resolved spontaneously but this one seems to be taking longer.  No significant problems on the left.  No night pain or rest pain.    She is not diabetic or hypothyroid.  She does not smoke.      REVIEW OF SYSTEMS       A 30-item multi-system Review Of Systems was  obtained on today's intake form.  This was reviewed with the patient and is correct.  The pertinent positives and negatives are listed above.  The form has been scanned separately into the medical record.      PHYSICAL EXAM    Constitutional:    Appears stated age. Well-developed and well-nourished female in no acute distress.  Psychiatric:         Pleasant normal mood and affect. Behavior is appropriate for the situation.   Head:                   Normocephalic and atraumatic.  Eyes:                    Pupils are equal and round.  Cardiovascular:  2+ radial and ulnar pulses. Fingers well-perfused.  Respiratory:        Effort normal. No respiratory distress. Speaking in complete sentences.  Neurologic:       Alert and oriented to person, place, and time.  Skin:                Skin is intact, warm and dry.  Hematologic / Lymphatic:    No lymphedema or lymphangitis.    Extremities / Musculoskeletal:                      Skin of right index finger and hand is intact with no erythema, ecchymosis, or diffuse swelling.  Normal skin dragged and coloration.  Full composite finger flexion extension but a little subjective stiffness primarily at the right index MP and PIP.  Joints are stable to varus and valgus stress but grind does cause discomfort reproducing her chief complaint.  No triggering.  No flexor nodules.  Good wrist and forearm motion.  Sensation intact to light touch in all distributions.  Capillary refill less than 2 seconds.      IMAGING / LABS / EMGs           X-rays right hand from June 19 at [facility] were independently interpreted by me today and show no acute fracture or foreign body.  There is moderate osteoarthritic narrowing of multiple joints MP, PIP, and DIP, most notably in the long ray.      Medical History[1]    Medication Documentation Review Audit       Reviewed by Lev Dey MD (Physician) on 08/04/25 at 0012      Medication Order Taking? Sig Documenting Provider Last Dose Status    atorvastatin (Lipitor) 10 mg tablet [id number]  TAKE 1 TABLET DAILY Aida Santos MD  Active   biotin 5 mg capsule [id number] No Take 1 capsule (5 mg) by mouth once daily. Historical Provider, MD Taking Active   calcium carbonate (CALTRATE 600 ORAL) [id number] No Take 1 tablet by mouth once daily. Historical Provider, MD Taking Active   carvedilol (Coreg) 6.25 mg tablet [id number]  TAKE 1 TABLET TWICE A DAY WITH MEALS Therese Kelly, APRN-CNP  Active   cholecalciferol (Vitamin D-3) 25 MCG (1000 UT) tablet [id number] No Vitamin D3 25 MCG (1000 UT) Oral Tablet   Refills: 0        Start : 3-May-2021   Active Historical Provider, MD Taking Active   fluticasone (Flonase) 50 mcg/actuation nasal spray [id number] No Administer 2 sprays into each nostril once daily as needed. Historical Provider, MD Taking Active   multivitamin tablet [id number] No Take 1 tablet by mouth once daily. (Nutritional Supplement). Historical Provider, MD Taking Active   zoledronic acid (Reclast) 5 mg/100 mL piggyback [id number] No Infuse 100 mL (5 mg) into a venous catheter 1 time for 1 dose. Infuse 5mg intravenously over 15 minutes as directed. Aida Santos MD Taking  25 2359   zoledronic acid (Reclast) IVPB 5 mg [id number]   Aida Santos MD  Active                    RX Allergies[2]    Social History     Socioeconomic History   • Marital status:      Spouse name: Not on file   • Number of children: Not on file   • Years of education: Not on file   • Highest education level: Not on file   Occupational History   • Not on file   Tobacco Use   • Smoking status: Never   • Smokeless tobacco: Never   Substance and Sexual Activity   • Alcohol use: Not Currently     Comment: social   • Drug use: Never   • Sexual activity: Not on file   Other Topics Concern   • Not on file   Social History Narrative   • Not on file     Social Drivers of Health     Financial Resource Strain: Not on file   Food Insecurity: No Food  Insecurity (10/28/2024)    Hunger Vital Sign    • Worried About Running Out of Food in the Last Year: Never true    • Ran Out of Food in the Last Year: Never true   Transportation Needs: No Transportation Needs (10/28/2024)    PRAPARE - Transportation    • Lack of Transportation (Medical): No    • Lack of Transportation (Non-Medical): No   Physical Activity: Not on file   Stress: Not on file   Social Connections: Not on file   Intimate Partner Violence: Not on file   Housing Stability: Unknown (10/28/2024)    Housing Stability Vital Sign    • Unable to Pay for Housing in the Last Year: No    • Number of Times Moved in the Last Year: Not on file    • Homeless in the Last Year: No       Surgical History[3]      Electronically Signed      PEYTON Dey MD      Orthopaedic Hand Surgery      [phone]       [1]  Past Medical History:  Diagnosis Date   • Abscess of eyelid right eye, unspecified eyelid 06/15/2020    Cellulitis of right eyelid   • Age-related osteoporosis without current pathological fracture 02/22/2021    Age related osteoporosis   • Body mass index (BMI) 24.0-24.9, adult 11/18/2020    Body mass index (BMI) of 24.0 to 24.9 in adult   • Chronic sinusitis, unspecified 03/02/2021    Other sinusitis   • Encounter for other screening for malignant neoplasm of breast 02/22/2021    Breast screening   • Encounter for screening for depression 11/18/2020    Depression screen   • Encounter for screening for malignant neoplasm of cervix 11/18/2020    Cervical cancer screening   • Headache, unspecified 02/23/2022    Scalp pain   • Personal history of other diseases of the circulatory system 03/02/2021    History of subdural hematoma   • Personal history of other diseases of the respiratory system 11/13/2018    History of allergic rhinitis   • Personal history of other medical treatment     H/O bone density study   [2]  Allergies  Allergen Reactions   • Codeine Nausea Only and Other     Sweating   • Nsaids  (Non-Steroidal Anti-Inflammatory Drug) Other     Has 1 kidney    [3]  Past Surgical History:  Procedure Laterality Date   • CT ABDOMEN PELVIS ANGIOGRAM W AND/OR WO IV CONTRAST  10/11/2023    CT ABDOMEN PELVIS ANGIOGRAM W AND/OR WO IV CONTRAST 10/11/2023 [id number]   • OTHER SURGICAL HISTORY  11/13/2018    Kidney surgery   • OTHER SURGICAL HISTORY  11/13/2018    Rotator cuff repair   • OTHER SURGICAL HISTORY  11/13/2018    Nephrectomy   • OTHER SURGICAL HISTORY  2012    Colonoscopy 2023 polyp

## 2025-08-04 PROBLEM — M25.441 FINGER JOINT SWELLING, RIGHT: Status: ACTIVE | Noted: 2025-08-04

## 2025-08-04 NOTE — PROGRESS NOTES
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  I reviewed the options for management, including observation, nonsteroidals, activity modification, splinting, oral steroids, cortisone injection, or surgical joint replacement or fusion, along with the major risks and benefits, and likely success rates of each.     The patient did not want anything invasive at this time, and we will continue with activity modification, splints, and nonsteroidals as needed, and followup with any concerns.        HISTORY OF PRESENT ILLNESS       Patient is a 74 y.o. right-hand dominant female retiree, who presents today for evaluation of intermittent pain and swelling of multiple joints in the right hand.  The index finger has been flaring and stiff over the last 5 or 6 weeks.  No single specific recalled trauma.  She has had similar episodes in the long and ring intermittently in the past.  Those resolved spontaneously but this one seems to be taking longer.  No significant problems on the left.  No night pain or rest pain.    She is not diabetic or hypothyroid.  She does not smoke.      REVIEW OF SYSTEMS       A 30-item multi-system Review Of Systems was obtained on today's intake form.  This was reviewed with the patient and is correct.  The pertinent positives and negatives are listed above.  The form has been scanned separately into the medical record.      PHYSICAL EXAM    Constitutional:    Appears stated age. Well-developed and well-nourished female in no acute distress.  Psychiatric:         Pleasant normal mood and affect. Behavior is appropriate for the situation.   Head:                   Normocephalic and atraumatic.  Eyes:                    Pupils are equal and round.  Cardiovascular:  2+ radial and ulnar pulses. Fingers  well-perfused.  Respiratory:        Effort normal. No respiratory distress. Speaking in complete sentences.  Neurologic:       Alert and oriented to person, place, and time.  Skin:                Skin is intact, warm and dry.  Hematologic / Lymphatic:    No lymphedema or lymphangitis.    Extremities / Musculoskeletal:                      Skin of right index finger and hand is intact with no erythema, ecchymosis, or diffuse swelling.  Normal skin dragged and coloration.  Full composite finger flexion extension but a little subjective stiffness primarily at the right index MP and PIP.  Joints are stable to varus and valgus stress but grind does cause discomfort reproducing her chief complaint.  No triggering.  No flexor nodules.  Good wrist and forearm motion.  Sensation intact to light touch in all distributions.  Capillary refill less than 2 seconds.      IMAGING / LABS / EMGs           X-rays right hand from June 19 at [place] were independently interpreted by me today and show no acute fracture or foreign body.  There is moderate osteoarthritic narrowing of multiple joints MP, PIP, and DIP, most notably in the long ray.      Medical History[1]    Medication Documentation Review Audit       Reviewed by Lev Dey MD (Physician) on 08/04/25 at 1738      Medication Order Taking? Sig Documenting Provider Last Dose Status   atorvastatin (Lipitor) 10 mg tablet [id number]  TAKE 1 TABLET DAILY Aida Santos MD  Active   biotin 5 mg capsule [id number] No Take 1 capsule (5 mg) by mouth once daily. Historical Provider, MD Taking Active   calcium carbonate (CALTRATE 600 ORAL) [id number] No Take 1 tablet by mouth once daily. Historical Provider, MD Taking Active   carvedilol (Coreg) 6.25 mg tablet [id number]  TAKE 1 TABLET TWICE A DAY WITH MEALS Therese Kelly, APRN-CNP  Active   cholecalciferol (Vitamin D-3) 25 MCG (1000 UT) tablet [id number] No Vitamin D3 25 MCG (1000 UT) Oral Tablet   Refills: 0        Start :  3-May-2021   Active Historical Provider, MD Taking Active   fluticasone (Flonase) 50 mcg/actuation nasal spray [id number] No Administer 2 sprays into each nostril once daily as needed. Historical Provider, MD Taking Active   multivitamin tablet [id number] No Take 1 tablet by mouth once daily. (Nutritional Supplement). Historical Provider, MD Taking Active   zoledronic acid (Reclast) 5 mg/100 mL piggyback [id number] No Infuse 100 mL (5 mg) into a venous catheter 1 time for 1 dose. Infuse 5mg intravenously over 15 minutes as directed. Aida Santos MD Taking  25 8759   zoledronic acid (Reclast) IVPB 5 mg [id number]   Aida Santos MD  Active                    RX Allergies[2]    Social History     Socioeconomic History    Marital status:      Spouse name: Not on file    Number of children: Not on file    Years of education: Not on file    Highest education level: Not on file   Occupational History    Not on file   Tobacco Use    Smoking status: Never    Smokeless tobacco: Never   Substance and Sexual Activity    Alcohol use: Not Currently     Comment: social    Drug use: Never    Sexual activity: Not on file   Other Topics Concern    Not on file   Social History Narrative    Not on file     Social Drivers of Health     Financial Resource Strain: Not on file   Food Insecurity: No Food Insecurity (10/28/2024)    Hunger Vital Sign     Worried About Running Out of Food in the Last Year: Never true     Ran Out of Food in the Last Year: Never true   Transportation Needs: No Transportation Needs (10/28/2024)    PRAPARE - Transportation     Lack of Transportation (Medical): No     Lack of Transportation (Non-Medical): No   Physical Activity: Not on file   Stress: Not on file   Social Connections: Not on file   Intimate Partner Violence: Not on file   Housing Stability: Unknown (10/28/2024)    Housing Stability Vital Sign     Unable to Pay for Housing in the Last Year: No     Number of Times Moved  in the Last Year: Not on file     Homeless in the Last Year: No       Surgical History[3]      Electronically Signed      PEYTON Dey MD      Orthopaedic Hand Surgery      [phone]       [1]   Past Medical History:  Diagnosis Date    Abscess of eyelid right eye, unspecified eyelid 06/15/2020    Cellulitis of right eyelid    Age-related osteoporosis without current pathological fracture 02/22/2021    Age related osteoporosis    Body mass index (BMI) 24.0-24.9, adult 11/18/2020    Body mass index (BMI) of 24.0 to 24.9 in adult    Chronic sinusitis, unspecified 03/02/2021    Other sinusitis    Encounter for other screening for malignant neoplasm of breast 02/22/2021    Breast screening    Encounter for screening for depression 11/18/2020    Depression screen    Encounter for screening for malignant neoplasm of cervix 11/18/2020    Cervical cancer screening    Headache, unspecified 02/23/2022    Scalp pain    Personal history of other diseases of the circulatory system 03/02/2021    History of subdural hematoma    Personal history of other diseases of the respiratory system 11/13/2018    History of allergic rhinitis    Personal history of other medical treatment     H/O bone density study   [2]   Allergies  Allergen Reactions    Codeine Nausea Only and Other     Sweating    Nsaids (Non-Steroidal Anti-Inflammatory Drug) Other     Has 1 kidney    [3]   Past Surgical History:  Procedure Laterality Date    CT ABDOMEN PELVIS ANGIOGRAM W AND/OR WO IV CONTRAST  10/11/2023    CT ABDOMEN PELVIS ANGIOGRAM W AND/OR WO IV CONTRAST 10/11/2023 [id number]    OTHER SURGICAL HISTORY  11/13/2018    Kidney surgery    OTHER SURGICAL HISTORY  11/13/2018    Rotator cuff repair    OTHER SURGICAL HISTORY  11/13/2018    Nephrectomy    OTHER SURGICAL HISTORY  2012    Colonoscopy 2023 polyp

## 2025-08-22 ENCOUNTER — OFFICE VISIT (OUTPATIENT)
Dept: PRIMARY CARE | Facility: CLINIC | Age: 75
End: 2025-08-22
Payer: MEDICARE

## 2025-08-22 VITALS
SYSTOLIC BLOOD PRESSURE: 132 MMHG | DIASTOLIC BLOOD PRESSURE: 72 MMHG | HEART RATE: 63 BPM | TEMPERATURE: 97.1 F | WEIGHT: 152.4 LBS | BODY MASS INDEX: 23.1 KG/M2 | OXYGEN SATURATION: 98 % | HEIGHT: 68 IN

## 2025-08-22 DIAGNOSIS — R09.89 SINUS SYMPTOM: Primary | ICD-10-CM

## 2025-08-22 DIAGNOSIS — H61.21 IMPACTED CERUMEN, RIGHT EAR: ICD-10-CM

## 2025-08-22 LAB — POC RAPID STREP: NEGATIVE

## 2025-08-22 PROCEDURE — 3075F SYST BP GE 130 - 139MM HG: CPT | Performed by: NURSE PRACTITIONER

## 2025-08-22 PROCEDURE — 1160F RVW MEDS BY RX/DR IN RCRD: CPT | Performed by: NURSE PRACTITIONER

## 2025-08-22 PROCEDURE — 1159F MED LIST DOCD IN RCRD: CPT | Performed by: NURSE PRACTITIONER

## 2025-08-22 PROCEDURE — 1036F TOBACCO NON-USER: CPT | Performed by: NURSE PRACTITIONER

## 2025-08-22 PROCEDURE — 69209 REMOVE IMPACTED EAR WAX UNI: CPT | Performed by: NURSE PRACTITIONER

## 2025-08-22 PROCEDURE — 3008F BODY MASS INDEX DOCD: CPT | Performed by: NURSE PRACTITIONER

## 2025-08-22 PROCEDURE — 3078F DIAST BP <80 MM HG: CPT | Performed by: NURSE PRACTITIONER

## 2025-08-22 PROCEDURE — 87880 STREP A ASSAY W/OPTIC: CPT | Performed by: NURSE PRACTITIONER

## 2025-08-22 PROCEDURE — 99213 OFFICE O/P EST LOW 20 MIN: CPT | Performed by: NURSE PRACTITIONER

## 2025-08-28 ENCOUNTER — TELEPHONE (OUTPATIENT)
Dept: PRIMARY CARE | Facility: CLINIC | Age: 75
End: 2025-08-28
Payer: MEDICARE

## 2025-08-28 DIAGNOSIS — J01.90 ACUTE SINUSITIS, RECURRENCE NOT SPECIFIED, UNSPECIFIED LOCATION: Primary | ICD-10-CM

## 2025-08-28 RX ORDER — AMOXICILLIN AND CLAVULANATE POTASSIUM 875; 125 MG/1; MG/1
875 TABLET, FILM COATED ORAL 2 TIMES DAILY
Qty: 14 TABLET | Refills: 0 | Status: SHIPPED | OUTPATIENT
Start: 2025-08-28 | End: 2025-09-04

## 2025-09-02 ENCOUNTER — TELEPHONE (OUTPATIENT)
Dept: NEUROLOGY | Facility: CLINIC | Age: 75
End: 2025-09-02
Payer: MEDICARE

## 2025-09-02 ENCOUNTER — TELEPHONE (OUTPATIENT)
Dept: PRIMARY CARE | Facility: CLINIC | Age: 75
End: 2025-09-02
Payer: MEDICARE

## 2025-11-19 ENCOUNTER — APPOINTMENT (OUTPATIENT)
Dept: PRIMARY CARE | Facility: CLINIC | Age: 75
End: 2025-11-19
Payer: MEDICARE